# Patient Record
Sex: MALE | Race: BLACK OR AFRICAN AMERICAN | NOT HISPANIC OR LATINO | Employment: OTHER | URBAN - METROPOLITAN AREA
[De-identification: names, ages, dates, MRNs, and addresses within clinical notes are randomized per-mention and may not be internally consistent; named-entity substitution may affect disease eponyms.]

---

## 2017-01-20 ENCOUNTER — HOSPITAL ENCOUNTER (INPATIENT)
Facility: HOSPITAL | Age: 72
LOS: 1 days | Discharge: HOME | DRG: 811 | End: 2017-01-21
Attending: EMERGENCY MEDICINE | Admitting: HOSPITALIST
Payer: MEDICARE

## 2017-01-20 DIAGNOSIS — D64.9 SYMPTOMATIC ANEMIA: Primary | ICD-10-CM

## 2017-01-20 DIAGNOSIS — Z72.0 TOBACCO ABUSE: ICD-10-CM

## 2017-01-20 DIAGNOSIS — S13.9XXD CERVICAL SPRAIN, SUBSEQUENT ENCOUNTER: ICD-10-CM

## 2017-01-20 DIAGNOSIS — M96.1 POSTLAMINECTOMY SYNDROME: ICD-10-CM

## 2017-01-20 DIAGNOSIS — D50.0 IRON DEFICIENCY ANEMIA DUE TO CHRONIC BLOOD LOSS: ICD-10-CM

## 2017-01-20 DIAGNOSIS — R55 SYNCOPE: ICD-10-CM

## 2017-01-20 DIAGNOSIS — I10 HYPERTENSION, BENIGN: ICD-10-CM

## 2017-01-20 LAB
ABO + RH BLD: NORMAL
ALBUMIN SERPL BCP-MCNC: 3.9 G/DL
ALP SERPL-CCNC: 76 U/L
ALT SERPL W/O P-5'-P-CCNC: 12 U/L
ANION GAP SERPL CALC-SCNC: 7 MMOL/L
ANISOCYTOSIS BLD QL SMEAR: SLIGHT
AST SERPL-CCNC: 13 U/L
BASOPHILS NFR BLD: 2 %
BILIRUB SERPL-MCNC: 0.3 MG/DL
BILIRUB UR QL STRIP: NEGATIVE
BLD GP AB SCN CELLS X3 SERPL QL: NORMAL
BLOOD GROUP ANTIBODIES SERPL: NORMAL
BNP SERPL-MCNC: <10 PG/ML
BUN SERPL-MCNC: 14 MG/DL
CALCIUM SERPL-MCNC: 8.5 MG/DL
CHLORIDE SERPL-SCNC: 110 MMOL/L
CLARITY UR: CLEAR
CO2 SERPL-SCNC: 24 MMOL/L
COLOR UR: YELLOW
CREAT SERPL-MCNC: 0.9 MG/DL
DACRYOCYTES BLD QL SMEAR: ABNORMAL
DIFFERENTIAL METHOD: ABNORMAL
EOSINOPHIL NFR BLD: 3 %
ERYTHROCYTE [DISTWIDTH] IN BLOOD BY AUTOMATED COUNT: 25.8 %
EST. GFR  (AFRICAN AMERICAN): >60 ML/MIN/1.73 M^2
EST. GFR  (NON AFRICAN AMERICAN): >60 ML/MIN/1.73 M^2
GLUCOSE SERPL-MCNC: 88 MG/DL
GLUCOSE UR QL STRIP: NEGATIVE
HCT VFR BLD AUTO: 21.4 %
HGB BLD-MCNC: 6 G/DL
HGB UR QL STRIP: NEGATIVE
HYPOCHROMIA BLD QL SMEAR: ABNORMAL
KETONES UR QL STRIP: NEGATIVE
LEUKOCYTE ESTERASE UR QL STRIP: NEGATIVE
LYMPHOCYTES NFR BLD: 6 %
MAGNESIUM SERPL-MCNC: 2.2 MG/DL
MCH RBC QN AUTO: 16 PG
MCHC RBC AUTO-ENTMCNC: 28 %
MCV RBC AUTO: 57 FL
MONOCYTES NFR BLD: 1 %
NEUTROPHILS NFR BLD: 88 %
NITRITE UR QL STRIP: NEGATIVE
NRBC BLD-RTO: 1 /100 WBC
OVALOCYTES BLD QL SMEAR: ABNORMAL
PH UR STRIP: 5 [PH] (ref 5–8)
PLATELET # BLD AUTO: 549 K/UL
PMV BLD AUTO: ABNORMAL FL
POIKILOCYTOSIS BLD QL SMEAR: ABNORMAL
POLYCHROMASIA BLD QL SMEAR: ABNORMAL
POTASSIUM SERPL-SCNC: 3.8 MMOL/L
PROT SERPL-MCNC: 6.7 G/DL
PROT UR QL STRIP: NEGATIVE
RBC # BLD AUTO: 3.74 M/UL
SCHISTOCYTES BLD QL SMEAR: PRESENT
SODIUM SERPL-SCNC: 141 MMOL/L
SP GR UR STRIP: 1.02 (ref 1–1.03)
TARGETS BLD QL SMEAR: ABNORMAL
TROPONIN I SERPL DL<=0.01 NG/ML-MCNC: <0.006 NG/ML
URN SPEC COLLECT METH UR: ABNORMAL
UROBILINOGEN UR STRIP-ACNC: ABNORMAL EU/DL
WBC # BLD AUTO: 10.73 K/UL

## 2017-01-20 PROCEDURE — 85027 COMPLETE CBC AUTOMATED: CPT

## 2017-01-20 PROCEDURE — 81003 URINALYSIS AUTO W/O SCOPE: CPT

## 2017-01-20 PROCEDURE — 83880 ASSAY OF NATRIURETIC PEPTIDE: CPT

## 2017-01-20 PROCEDURE — 11000001 HC ACUTE MED/SURG PRIVATE ROOM

## 2017-01-20 PROCEDURE — 86905 BLOOD TYPING RBC ANTIGENS: CPT

## 2017-01-20 PROCEDURE — 83735 ASSAY OF MAGNESIUM: CPT

## 2017-01-20 PROCEDURE — 85007 BL SMEAR W/DIFF WBC COUNT: CPT

## 2017-01-20 PROCEDURE — 84484 ASSAY OF TROPONIN QUANT: CPT

## 2017-01-20 PROCEDURE — 99285 EMERGENCY DEPT VISIT HI MDM: CPT

## 2017-01-20 PROCEDURE — 63600175 PHARM REV CODE 636 W HCPCS: Performed by: EMERGENCY MEDICINE

## 2017-01-20 PROCEDURE — C9113 INJ PANTOPRAZOLE SODIUM, VIA: HCPCS | Performed by: EMERGENCY MEDICINE

## 2017-01-20 PROCEDURE — 86900 BLOOD TYPING SEROLOGIC ABO: CPT

## 2017-01-20 PROCEDURE — 86901 BLOOD TYPING SEROLOGIC RH(D): CPT

## 2017-01-20 PROCEDURE — 86922 COMPATIBILITY TEST ANTIGLOB: CPT

## 2017-01-20 PROCEDURE — 25000003 PHARM REV CODE 250: Performed by: EMERGENCY MEDICINE

## 2017-01-20 PROCEDURE — 80053 COMPREHEN METABOLIC PANEL: CPT

## 2017-01-20 PROCEDURE — 86870 RBC ANTIBODY IDENTIFICATION: CPT | Mod: 91

## 2017-01-20 RX ORDER — IBUPROFEN 200 MG
1 TABLET ORAL DAILY
Status: DISCONTINUED | OUTPATIENT
Start: 2017-01-20 | End: 2017-01-21 | Stop reason: HOSPADM

## 2017-01-20 RX ORDER — BENAZEPRIL HYDROCHLORIDE 10 MG/1
20 TABLET ORAL DAILY
Status: DISCONTINUED | OUTPATIENT
Start: 2017-01-21 | End: 2017-01-21 | Stop reason: HOSPADM

## 2017-01-20 RX ORDER — HYDROCODONE BITARTRATE AND ACETAMINOPHEN 500; 5 MG/1; MG/1
TABLET ORAL
Status: DISCONTINUED | OUTPATIENT
Start: 2017-01-20 | End: 2017-01-21 | Stop reason: HOSPADM

## 2017-01-20 RX ORDER — ONDANSETRON 2 MG/ML
4 INJECTION INTRAMUSCULAR; INTRAVENOUS EVERY 12 HOURS PRN
Status: DISCONTINUED | OUTPATIENT
Start: 2017-01-20 | End: 2017-01-21 | Stop reason: HOSPADM

## 2017-01-20 RX ORDER — AMLODIPINE BESYLATE 5 MG/1
5 TABLET ORAL DAILY
Status: DISCONTINUED | OUTPATIENT
Start: 2017-01-21 | End: 2017-01-21 | Stop reason: HOSPADM

## 2017-01-20 RX ORDER — PANTOPRAZOLE SODIUM 40 MG/1
40 TABLET, DELAYED RELEASE ORAL DAILY
Status: CANCELLED | OUTPATIENT
Start: 2017-01-21

## 2017-01-20 RX ORDER — ACETAMINOPHEN 325 MG/1
650 TABLET ORAL EVERY 6 HOURS PRN
Status: DISCONTINUED | OUTPATIENT
Start: 2017-01-20 | End: 2017-01-21 | Stop reason: HOSPADM

## 2017-01-20 RX ORDER — PANTOPRAZOLE SODIUM 40 MG/10ML
40 INJECTION, POWDER, LYOPHILIZED, FOR SOLUTION INTRAVENOUS DAILY
Status: DISCONTINUED | OUTPATIENT
Start: 2017-01-20 | End: 2017-01-21 | Stop reason: HOSPADM

## 2017-01-20 RX ADMIN — NICOTINE 1 PATCH: 21 PATCH, EXTENDED RELEASE TRANSDERMAL at 06:01

## 2017-01-20 RX ADMIN — PANTOPRAZOLE SODIUM 40 MG: 40 INJECTION, POWDER, FOR SOLUTION INTRAVENOUS at 09:01

## 2017-01-20 NOTE — IP AVS SNAPSHOT
[place] [address] [address] [place] [address]  Phone: [phone]           Patient Discharge Instructions     Our goal is to set you up for success. This packet includes information on your condition, medications, and your home care. It will help you to care for yourself so you don't get sicker and need to go back to the hospital.     Please ask your nurse if you have any questions.        There are many details to remember when preparing to leave the hospital. Here is what you will need to do:    1. Take your medicine. If you are prescribed medications, review your Medication List in the following pages. You may have new medications to  at the pharmacy and others that you'll need to stop taking. Review the instructions for how and when to take your medications. Talk with your doctor or nurses if you are unsure of what to do.     2. Go to your follow-up appointments. Specific follow-up information is listed in the following pages. Your may be contacted by a transition nurse or clinical provider about future appointments. Be sure we have all of the phone numbers to reach you, if needed. Please contact your provider's office if you are unable to make an appointment.     3. Watch for warning signs. Your doctor or nurse will give you detailed warning signs to watch for and when to call for assistance. These instructions may also include educational information about your condition. If you experience any of warning signs to your health, call your doctor.               ** Verify the list of medication(s) below is accurate and up to date. Carry this with you in case of emergency. If your medications have changed, please notify your healthcare provider.             Medication List      START taking these medications        Additional Info                      nicotine 21 mg/24 hr   Commonly known as:  NICODERM CQ   Refills:  0   Dose:  1 patch    Last time this was given:  1 patch on  1/21/2017  8:54 AM   Instructions:  Place 1 patch onto the skin once daily.     Begin Date    AM    Noon    PM    Bedtime         CONTINUE taking these medications        Additional Info                      amlodipine-benazepril 5-20 mg 5-20 mg per capsule   Commonly known as:  LOTREL   Refills:  0      Begin Date    AM    Noon    PM    Bedtime       baclofen 10 MG tablet   Commonly known as:  LIORESAL   Refills:  0      Begin Date    AM    Noon    PM    Bedtime       CIALIS 20 MG Tab   Refills:  0   Generic drug:  tadalafil      Begin Date    AM    Noon    PM    Bedtime       gabapentin 300 MG capsule   Commonly known as:  NEURONTIN   Refills:  0   Dose:  300 mg    Last time this was given:  300 mg on 1/21/2017  8:51 AM   Instructions:  Take 300 mg by mouth 2 (two) times daily.     Begin Date    AM    Noon    PM    Bedtime       hydrocodone-acetaminophen 5-325mg 5-325 mg per tablet   Commonly known as:  NORCO   Refills:  1      Begin Date    AM    Noon    PM    Bedtime         STOP taking these medications     aspirin 81 MG EC tablet   Commonly known as:  ECOTRIN       diazePAM 5 MG tablet   Commonly known as:  VALIUM       oxycodone-acetaminophen 7.5-500 mg per tablet   Commonly known as:  PERCOCET       tramadol 50 mg tablet   Commonly known as:  ULTRAM       UNKNOWN TO PATIENT       VIAGRA 100 MG tablet   Generic drug:  sildenafil            Where to Get Your Medications      You can get these medications from any pharmacy     You don't need a prescription for these medications     nicotine 21 mg/24 hr                  Please bring to all follow up appointments:    1. A copy of your discharge instructions.  2. All medicines you are currently taking in their original bottles.  3. Identification and insurance card.    Please arrive 15 minutes ahead of scheduled appointment time.    Please call 24 hours in advance if you must reschedule your appointment and/or time.        Follow-up Information     Follow up with  Robert Sosa MD In 1 week.    Specialty:  Internal Medicine    Contact information:    [facility] [address]  [phone]          Discharge Instructions     Future Orders    Activity as tolerated     Call MD for:  persistent dizziness, light-headedness, or visual disturbances     Call MD for:  persistent nausea and vomiting or diarrhea     Call MD for:  temperature >100.4     Diet Cardiac         Primary Diagnosis     Your primary diagnosis was:  Not on File      Admission Information     Date & Time Provider Department CSN    1/20/2017  1:54 PM Nahomi Edgar MD [facility] [id number]      Care Providers     Provider Role Specialty Primary office phone    Nahomi Edgar MD Attending Provider Hospitalist [phone]    Virgil Johnson MD Consulting Physician  Gastroenterology [phone]    Doreen Figueroa MD Consulting Physician  Hematology and Oncology [phone]      Your Vitals Were     BP Pulse Temp Resp Height Weight    135/72 90 98.5 °F (36.9 °C) (Oral) 18 6' (1.829 m) 81.7 kg (180 lb 0.1 oz)    SpO2 BMI             99% 24.41 kg/m2         Recent Lab Values     No lab values to display.      Pending Labs     Order Current Status    Prepare RBC 2 Units Preliminary result      Allergies as of 1/21/2017     No Known Allergies      [facility]     [facility] Nurse Care Line - 24/7 Assistance  Unless otherwise directed by your provider, please contact [facility], our nurse care line that is available for 24/7 assistance.     Registered nurses in the [facility] provide clinical advisement, health education, appointment booking, and other advisory services.  Call for this free service at [phone].        Advance Directives     An advance directive is a document which, in the event you are no longer able to make decisions for yourself, tells your healthcare team what kind of treatment you do or do not want to receive, or who you  would like to make those decisions for you.  If you do not currently have an advance directive, [facility] encourages you to create one.  For more information call:  [phone]), [phone] ([phone]),  or log on to [email or web address].        Smoking Cessation     If you would like to quit smoking:   You may be eligible for free services if you are a [place] resident and started smoking cigarettes before September 1, 1988.  Call the Smoking Cessation [employer] (SCT) toll free at (572) 323-0325 or (984) 465-9724.   Call 4-978-QUIT-NOW if you do not meet the above criteria.            Language Assistance Services     ATTENTION: Language assistance services are available, free of charge. Please call [phone].      ATENCIÓN: Si habla kamar, tiene a marlow disposición servicios gratuitos de asistencia lingüística. Llame al [phone].     [facility] Ý: N?u b?n nói Ti?ng Vi?t, có các d?ch v? h? tr? ngôn ng? mi?n phí dành cho b?n. G?i s? [phone].        Blood Transfusion Reaction Signs and Symptoms     The blood you have received has been matched for you as carefully as possible. Most patients who receive a blood transfusion do not experience problems. However, there can be a delayed reaction that happens a few weeks after your blood transfusion. Contact your physician immediately if you experience any NEW SYMPTOMS listed below:     Fever greater than 100.4 degrees    Chills   Yellow color to your skin or eyes(Jaundice)   Back pain, chest pain, or pain at the infusion site   Weakness (more than usual)   Discomfort or uneasiness more than usual (Malaise)   Nausea or vomiting   Shortness of breath, wheezing, or coughing   Higher or lower blood pressure than normal   Skin rash, itching, skin redness, or localized swelling (example: hands or feet)   Urinating less than normal   Urine appears reddish or orange and is darker than normal      Remember that some these signs may already  exist for you--such as having chronic back pain or high blood pressure. You only need to look for and report to your doctor any new occurrences since your blood transfusion that are of concern.         [facility] complies with applicable Federal civil rights laws and does not discriminate on the basis of race, color, national origin, age, disability, or sex.

## 2017-01-20 NOTE — ED NOTES
Hourly rounding performed.  Pt resting in bed, side rails up, call light within reach, wife at bedside.  Cardiac, BP, and pulse ox monitoring in place, VS stable.  No complaints at this time, will continue to monitor.

## 2017-01-20 NOTE — ED TRIAGE NOTES
Pt arrived to ED from home via EMS.  Pt had been standing on a ladder cleaning vents in his bathroom with bleach and began to feel lightheaded.  Pt stated once he got down from the ladder he began to feel a bit nauseous and felt lightheaded.  EMS reported that he was slightly hypotensive and slightly orthostatic (drop of 10 mmHg systolic) when EMS arrived.  EMS gave 500 mL fluids en route to ED and BP stabilized.  Pt states he does have low iron levels and takes iron supplements.

## 2017-01-20 NOTE — ED PROVIDER NOTES
"Encounter Date: 1/20/2017    SCRIBE #1 NOTE: I, Natasha Trinidad, am scribing for, and in the presence of,  Itzel Coronado MD. I have scribed the following portions of the note - Other sections scribed: HPI/ROS.       History     Chief Complaint   Patient presents with    Loss of Consciousness     Pt states he feels dizzy standing up.  Doesn't remember falling      Review of patient's allergies indicates:  No Known Allergies  HPI Comments: CC: Lightheadedness    HPI: 71 y.o. M with HTN and iron deficiency enema presents to the ED via EMS c/o lightheadedness beginning PTA. Associated symptoms are nausea (resolved), emesis x1, and dizziness. Pt states he was cleaning out his air vents with bleach prior to onset of symptoms. Bedroom window was open, but the area was mainly enclosed. Pt was walking to his bed when he began to feel dizzy. Pt states, "I almost blacked out but managed to catch myself to the bed." Pt slightly hit his head on the bed post but is not c/o any pain. Per EMS, pt's orthostatic readings varied. Pt was given fluids en route and reports of feeling better. Pt has required blood transfusions in the past. Pt recently had his blood tested last Wednesday and states that his hemoglobin level was low. Pt denies headache, LOC, CP, and SOB.     Addendum: pt states he underwent capsule endoscopy by GI and was told he has an upper GI bleed for which they are medically managing at this time.    The history is provided by the patient.     Past Medical History   Diagnosis Date    Back pain     Hypertension     Hypertension, benign 6/11/2013     Diagnosed 1983.     No past medical history pertinent negatives.  Past Surgical History   Procedure Laterality Date    Back surgery       History reviewed. No pertinent family history.  Social History   Substance Use Topics    Smoking status: Current Every Day Smoker     Packs/day: 1.00     Years: 50.00    Smokeless tobacco: None    Alcohol use Yes      Comment: Socially. "     Review of Systems   Constitutional: Negative for chills and fever.   HENT: Negative for congestion and sore throat.    Eyes: Negative for visual disturbance.   Respiratory: Negative for cough and shortness of breath.    Cardiovascular: Negative for chest pain.   Gastrointestinal: Negative for abdominal pain, diarrhea, nausea and vomiting.   Genitourinary: Negative for dysuria.   Musculoskeletal: Negative for neck pain.   Skin: Negative for rash and wound.   Neurological: Positive for dizziness and light-headedness. Negative for headaches.        (-) LOC       Physical Exam   Initial Vitals   BP Pulse Resp Temp SpO2   01/20/17 1355 01/20/17 1355 01/20/17 1355 01/20/17 1355 01/20/17 1355   111/65 80 18 98.7 °F (37.1 °C) 94 %     Physical Exam    Nursing note and vitals reviewed.  Constitutional: Vital signs are normal. He appears well-developed and well-nourished. He is active.  Non-toxic appearance. No distress.   HENT:   Head: Normocephalic and atraumatic.   Eyes: EOM are normal.   Pallor to conjunctivae.   Neck: Trachea normal. Neck supple.   Cardiovascular: Normal rate, regular rhythm and normal heart sounds.   Pulmonary/Chest: Breath sounds normal. No respiratory distress.   Abdominal: Soft. Normal appearance and bowel sounds are normal. He exhibits no distension. There is no tenderness.   Musculoskeletal: Normal range of motion. He exhibits no edema.   Neurological: He is alert.   Skin: Skin is warm, dry and intact.   Psychiatric: He has a normal mood and affect.         ED Course   Critical Care  Date/Time: 1/20/2017 4:06 PM  Performed by: BRITNI DE LA TORRE  Authorized by: BRITNI DE LA TORRE   Direct patient critical care time: 25 minutes  Additional history critical care time: 10 minutes  Ordering / reviewing critical care time: 8 minutes  Documentation critical care time: 5 minutes  Consulting other physicians critical care time: 5 minutes  Total critical care time (exclusive of procedural time) : 53  minutes  Critical care time was exclusive of separately billable procedures and treating other patients and teaching time.  Critical care was necessary to treat or prevent imminent or life-threatening deterioration of the following conditions: circulatory failure.  Critical care was time spent personally by me on the following activities: blood draw for specimens, development of treatment plan with patient or surrogate, discussions with consultants, evaluation of patient's response to treatment, examination of patient, obtaining history from patient or surrogate, ordering and performing treatments and interventions, ordering and review of laboratory studies, ordering and review of radiographic studies, pulse oximetry, re-evaluation of patient's condition and review of old charts.        Labs Reviewed   CBC W/ AUTO DIFFERENTIAL - Abnormal; Notable for the following:        Result Value    RBC 3.74 (*)     Hemoglobin 6.0 (*)     Hematocrit 21.4 (*)     MCV 57 (*)     MCH 16.0 (*)     MCHC 28.0 (*)     RDW 25.8 (*)     Platelets 549 (*)     nRBC 1 (*)     Gran% 88.0 (*)     Lymph% 6.0 (*)     Mono% 1.0 (*)     Basophil% 2.0 (*)     All other components within normal limits   COMPREHENSIVE METABOLIC PANEL - Abnormal; Notable for the following:     Calcium 8.5 (*)     Anion Gap 7 (*)     All other components within normal limits   URINALYSIS - Abnormal; Notable for the following:     Urobilinogen, UA 2.0-3.0 (*)     All other components within normal limits   MAGNESIUM   B-TYPE NATRIURETIC PEPTIDE   TROPONIN I   TYPE & SCREEN   ANTIBODY IDENTIFICATION     EKG Readings: (Independently Interpreted)   Initial Reading: No STEMI. Rhythm: Normal Sinus Rhythm. Heart Rate: 85. Ectopy: No Ectopy. Conduction: RBBB.       X-Rays:   Independently Interpreted Readings:   Chest X-Ray: Normal heart size.   Head CT: No hemorrhage.     Medical Decision Making:   History:   Old Medical Records: I decided to obtain old medical  records.  Initial Assessment:   This is an emergent evaluation of a 71-year-old man who presented to the emergency department today secondary to one episode of emesis, dizziness, lightheadedness, and syncope.  Differential Diagnosis:   Differential diagnoses include vasovagal syncope, anemia, electrolyte abnormality, infectious process, stroke, ACS.  Independently Interpreted Test(s):   I have ordered and independently interpreted X-rays - see prior notes.  I have ordered and independently interpreted EKG Reading(s) - see prior notes  Clinical Tests:   Lab Tests: Ordered and Reviewed  The following lab test(s) were unremarkable: CBC, CMP, Urinalysis, Troponin and BNP  Radiological Study: Ordered and Reviewed  Medical Tests: Ordered and Reviewed  ED Management:  On physical examination, patient was in no acute distress.  Lung sounds were clear to auscultation bilaterally and heart sounds were within normal limits.  Abdomen was soft, nontender, nondistended with good bowel sounds throughout.  He had pale conjunctiva. EKG was obtained and was unremarkable.  Chest x-ray showed no acute findings.  CT of the head showed no acute intracranial abnormalities however chronic microvascular ischemic changes.  Labs were concerning for a hemoglobin of 6 and hematocrit of 21.4.  The remainder of his labs were unremarkable.  I will admit him to medicine given his symptomatic anemia to receive a blood transfusion. I discussed this with his hematologist as well, who spoke with the patient and also advised blood transfusion.     Itzel Coronado MD  4:06 PM  1/20/2017    Pt was discussed with Dr. Edgar who agreed to admit the patient.     Itzel Coronado MD  5:25 PM  1/20/2017    Other:   I have discussed this case with another health care provider.       <> Summary of the Discussion: Dr. Edgar agrees to admit the patient.             Scribe Attestation:   Scribe #1: I performed the above scribed service and the documentation accurately  describes the services I performed. I attest to the accuracy of the note.    Attending Attestation:           Physician Attestation for Scribe:  Physician Attestation Statement for Scribe #1: I, Itzel Coronado MD, reviewed documentation, as scribed by Natasha Abdi in my presence, and it is both accurate and complete.                 ED Course     Clinical Impression:   The primary encounter diagnosis was Symptomatic anemia. A diagnosis of Syncope was also pertinent to this visit.          Itzel Coronado MD  01/20/17 2609

## 2017-01-21 VITALS
BODY MASS INDEX: 24.38 KG/M2 | HEIGHT: 72 IN | WEIGHT: 180 LBS | DIASTOLIC BLOOD PRESSURE: 72 MMHG | OXYGEN SATURATION: 99 % | SYSTOLIC BLOOD PRESSURE: 135 MMHG | HEART RATE: 90 BPM | TEMPERATURE: 99 F | RESPIRATION RATE: 18 BRPM

## 2017-01-21 PROBLEM — D64.9 SYMPTOMATIC ANEMIA: Status: ACTIVE | Noted: 2017-01-21

## 2017-01-21 PROBLEM — Z72.0 TOBACCO ABUSE: Status: ACTIVE | Noted: 2017-01-21

## 2017-01-21 PROBLEM — D50.0 IRON DEFICIENCY ANEMIA DUE TO CHRONIC BLOOD LOSS: Status: ACTIVE | Noted: 2017-01-21

## 2017-01-21 LAB
ALBUMIN SERPL BCP-MCNC: 3.7 G/DL
ALP SERPL-CCNC: 71 U/L
ALT SERPL W/O P-5'-P-CCNC: 12 U/L
ANION GAP SERPL CALC-SCNC: 8 MMOL/L
ANISOCYTOSIS BLD QL SMEAR: ABNORMAL
ANISOCYTOSIS BLD QL SMEAR: ABNORMAL
AST SERPL-CCNC: 14 U/L
BASOPHILS # BLD AUTO: 0.1 K/UL
BASOPHILS # BLD AUTO: 0.12 K/UL
BASOPHILS NFR BLD: 0.9 %
BASOPHILS NFR BLD: 1.6 %
BILIRUB SERPL-MCNC: 0.6 MG/DL
BLD PROD TYP BPU: NORMAL
BLD PROD TYP BPU: NORMAL
BLOOD UNIT EXPIRATION DATE: NORMAL
BLOOD UNIT EXPIRATION DATE: NORMAL
BLOOD UNIT TYPE CODE: 5100
BLOOD UNIT TYPE CODE: 5100
BLOOD UNIT TYPE: NORMAL
BLOOD UNIT TYPE: NORMAL
BUN SERPL-MCNC: 10 MG/DL
CALCIUM SERPL-MCNC: 8.5 MG/DL
CHLORIDE SERPL-SCNC: 110 MMOL/L
CO2 SERPL-SCNC: 22 MMOL/L
CODING SYSTEM: NORMAL
CODING SYSTEM: NORMAL
CREAT SERPL-MCNC: 0.8 MG/DL
DACRYOCYTES BLD QL SMEAR: ABNORMAL
DACRYOCYTES BLD QL SMEAR: ABNORMAL
DIFFERENTIAL METHOD: ABNORMAL
DIFFERENTIAL METHOD: ABNORMAL
DISPENSE STATUS: NORMAL
DISPENSE STATUS: NORMAL
EOSINOPHIL # BLD AUTO: 0.3 K/UL
EOSINOPHIL # BLD AUTO: 0.3 K/UL
EOSINOPHIL NFR BLD: 2.4 %
EOSINOPHIL NFR BLD: 4.4 %
ERYTHROCYTE [DISTWIDTH] IN BLOOD BY AUTOMATED COUNT: 26.3 %
ERYTHROCYTE [DISTWIDTH] IN BLOOD BY AUTOMATED COUNT: 27.9 %
EST. GFR  (AFRICAN AMERICAN): >60 ML/MIN/1.73 M^2
EST. GFR  (NON AFRICAN AMERICAN): >60 ML/MIN/1.73 M^2
GLUCOSE SERPL-MCNC: 89 MG/DL
HCT VFR BLD AUTO: 25.5 %
HCT VFR BLD AUTO: 28.6 %
HGB BLD-MCNC: 7.2 G/DL
HGB BLD-MCNC: 8.3 G/DL
HYPOCHROMIA BLD QL SMEAR: ABNORMAL
HYPOCHROMIA BLD QL SMEAR: ABNORMAL
LYMPHOCYTES # BLD AUTO: 1.2 K/UL
LYMPHOCYTES # BLD AUTO: 1.3 K/UL
LYMPHOCYTES NFR BLD: 10.5 %
LYMPHOCYTES NFR BLD: 17.3 %
MCH RBC QN AUTO: 17.2 PG
MCH RBC QN AUTO: 17.9 PG
MCHC RBC AUTO-ENTMCNC: 28.2 %
MCHC RBC AUTO-ENTMCNC: 29 %
MCV RBC AUTO: 61 FL
MCV RBC AUTO: 62 FL
MONOCYTES # BLD AUTO: 0.6 K/UL
MONOCYTES # BLD AUTO: 0.8 K/UL
MONOCYTES NFR BLD: 7 %
MONOCYTES NFR BLD: 8.3 %
NEUTROPHILS # BLD AUTO: 5.2 K/UL
NEUTROPHILS # BLD AUTO: 8.9 K/UL
NEUTROPHILS NFR BLD: 68.4 %
NEUTROPHILS NFR BLD: 79.6 %
OVALOCYTES BLD QL SMEAR: ABNORMAL
OVALOCYTES BLD QL SMEAR: ABNORMAL
PLATELET # BLD AUTO: 362 K/UL
PLATELET # BLD AUTO: 434 K/UL
PLATELET BLD QL SMEAR: ABNORMAL
PLATELET BLD QL SMEAR: ABNORMAL
PMV BLD AUTO: ABNORMAL FL
PMV BLD AUTO: ABNORMAL FL
POIKILOCYTOSIS BLD QL SMEAR: ABNORMAL
POIKILOCYTOSIS BLD QL SMEAR: ABNORMAL
POLYCHROMASIA BLD QL SMEAR: ABNORMAL
POLYCHROMASIA BLD QL SMEAR: ABNORMAL
POTASSIUM SERPL-SCNC: 4.5 MMOL/L
PROT SERPL-MCNC: 6.8 G/DL
RBC # BLD AUTO: 4.18 M/UL
RBC # BLD AUTO: 4.64 M/UL
SCHISTOCYTES BLD QL SMEAR: PRESENT
SCHISTOCYTES BLD QL SMEAR: PRESENT
SODIUM SERPL-SCNC: 140 MMOL/L
SPHEROCYTES BLD QL SMEAR: ABNORMAL
TARGETS BLD QL SMEAR: ABNORMAL
TRANS ERYTHROCYTES VOL PATIENT: NORMAL ML
TRANS ERYTHROCYTES VOL PATIENT: NORMAL ML
WBC # BLD AUTO: 11.25 K/UL
WBC # BLD AUTO: 7.57 K/UL

## 2017-01-21 PROCEDURE — 36430 TRANSFUSION BLD/BLD COMPNT: CPT

## 2017-01-21 PROCEDURE — 80053 COMPREHEN METABOLIC PANEL: CPT

## 2017-01-21 PROCEDURE — 25000003 PHARM REV CODE 250: Performed by: EMERGENCY MEDICINE

## 2017-01-21 PROCEDURE — 36415 COLL VENOUS BLD VENIPUNCTURE: CPT

## 2017-01-21 PROCEDURE — C9113 INJ PANTOPRAZOLE SODIUM, VIA: HCPCS | Performed by: EMERGENCY MEDICINE

## 2017-01-21 PROCEDURE — P9021 RED BLOOD CELLS UNIT: HCPCS

## 2017-01-21 PROCEDURE — 99222 1ST HOSP IP/OBS MODERATE 55: CPT | Mod: GC,,, | Performed by: INTERNAL MEDICINE

## 2017-01-21 PROCEDURE — 85025 COMPLETE CBC W/AUTO DIFF WBC: CPT | Mod: 91

## 2017-01-21 PROCEDURE — 25000003 PHARM REV CODE 250: Performed by: HOSPITALIST

## 2017-01-21 PROCEDURE — 63600175 PHARM REV CODE 636 W HCPCS: Performed by: EMERGENCY MEDICINE

## 2017-01-21 PROCEDURE — 86902 BLOOD TYPE ANTIGEN DONOR EA: CPT | Mod: 91

## 2017-01-21 RX ORDER — GABAPENTIN 300 MG/1
300 CAPSULE ORAL 2 TIMES DAILY
Status: DISCONTINUED | OUTPATIENT
Start: 2017-01-21 | End: 2017-01-21 | Stop reason: HOSPADM

## 2017-01-21 RX ORDER — IBUPROFEN 200 MG
1 TABLET ORAL DAILY
Refills: 0 | COMMUNITY
Start: 2017-01-21

## 2017-01-21 RX ORDER — HYDROCODONE BITARTRATE AND ACETAMINOPHEN 5; 325 MG/1; MG/1
1 TABLET ORAL EVERY 6 HOURS PRN
Status: DISCONTINUED | OUTPATIENT
Start: 2017-01-21 | End: 2017-01-21 | Stop reason: HOSPADM

## 2017-01-21 RX ADMIN — GABAPENTIN 300 MG: 300 CAPSULE ORAL at 08:01

## 2017-01-21 RX ADMIN — AMLODIPINE BESYLATE 5 MG: 5 TABLET ORAL at 08:01

## 2017-01-21 RX ADMIN — BENAZEPRIL HYDROCHLORIDE 20 MG: 10 TABLET, FILM COATED ORAL at 08:01

## 2017-01-21 RX ADMIN — PANTOPRAZOLE SODIUM 40 MG: 40 INJECTION, POWDER, FOR SOLUTION INTRAVENOUS at 08:01

## 2017-01-21 RX ADMIN — NICOTINE 1 PATCH: 21 PATCH, EXTENDED RELEASE TRANSDERMAL at 08:01

## 2017-01-21 NOTE — CONSULTS
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 He was getting IV Iron with feraheme every 3-4 months. Last IV iron Rx was about 4 months ago.     He got 2 units of blood and his Hb increased to 8.3 g/dL. He feels better and is ready to go home.    He is a very good historian and plans to see  for follow-up later this week. He is retired .    MEDS and ALLERGIES Reviewed    Past Medical History   Diagnosis Date    Back pain     Hypertension     Hypertension, benign 6/11/2013     Diagnosed 1983.     Past Surgical History   Procedure Laterality Date    Back surgery       History reviewed. No pertinent family history.  Social History   Substance Use Topics    Smoking status: Current Every Day Smoker     Packs/day: 1.00     Years: 50.00    Smokeless tobacco: None    Alcohol use Yes      Comment: Socially.       Review of Systems:  Constitutional: no fever or chills  Eyes: negative for icterus and redness  Respiratory: no cough. shorness of breath resolved.  Cardiovascular: chest pain or palpitations resolved.  Gastrointestinal: negative for abdominal pain, melena and nausea  Hematologic/Lymphatic: no easy bruising or lymphadenopathy  Neurological: no seizures or tremors    OBJECTIVE:     Vital Signs (Most Recent)  Temp: 98.5 °F (36.9 °C) (01/21/17 1138)  Pulse: 90 (01/21/17 1138)  Resp: 18 (01/21/17 0923)  BP: 135/72 (01/21/17 1138)  SpO2: 99 % (01/21/17 0923)    Pain Assessment: No pain reported at this time    Vital Signs Range (Last 24H):  Temp:  [96.3 °F (35.7 °C)-98.6 °F (37 °C)]   Pulse:  [76-90]   Resp:  [16-19]   BP: (118-150)/(58-80)   SpO2:  [97 %-100 %]     Physical  Exam:  General: well developed, well nourished  Eyes: negative findings: conjunctivae and sclerae normal   Lungs:  normal respiratory effort and no wheeze  Cardiovascular: Heart: regular rate and rhythm and S1, S2 normal.   Extremities: no cyanosis or edema, or clubbing.  Abdomen: no masses palpable and no organomegaly.  Lymph Nodes: No cervical or supraclavicular adenopathy  Neurologic: Normal strength and tone. No focal numbness or weakness  Mental status: Alert, oriented, thought content appropriate  Motor:grossly normal    Laboratory:  CBC with Differential:    Recent Labs  Lab 01/21/17  1348   WBC 7.57   LYMPH 17.3*  1.3   BASOPHIL 1.6   RBC 4.64   HCT 28.6*   HGB 8.3*   MCV 62*   MCH 17.9*   RDW 27.9*   *   MPV SEE COMMENT     CMP:    Recent Labs  Lab 01/21/17  0436   GLU 89   CALCIUM 8.5*   ALBUMIN 3.7   PROT 6.8      K 4.5   CO2 22*      BUN 10   CREATININE 0.8   ALKPHOS 71   ALT 12   AST 14   BILITOT 0.6       ASSESSMENT/PLAN:   1. 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 Hence the presence of schistocytes is not clinically significant.    He has had an appropriate increase in his Hb level. No further work-up needed form heme stand-point.    Ok to d/c home. He will follow-up with  later this week with repeat labs - to determine need for further IV iron therapy as out-pt.

## 2017-01-21 NOTE — ASSESSMENT & PLAN NOTE
+ antibodies to typed blood which delayed transfusion  On second unit of blood, for total of 2 units PRBCs  Repeat CBC after completion of second unit  H/o SB GIB - on protonix     GI and hematology consulted for any further recs, all previous studies and labs done elsewhere

## 2017-01-21 NOTE — PROGRESS NOTES
RN called lab for blood. Lab waiting for antigen report to come in. Lab will call RN once blood ready.

## 2017-01-21 NOTE — ED NOTES
Hourly rounding performed.  Pt resting in bed, side rails up, call light within reach, wife at bedside.  Cardiac, BP, and pulse ox monitoring in place, VS stable.  No complaints at this time, will continue to monitor.  Pt informed that he we are still waiting for room on the floor.  Waiting for nicotine patch from pharmacy.

## 2017-01-21 NOTE — ED NOTES
First contact with pt. Pt noted to be on cardiac monitor, automatic NIBP, pulse ox. Pt A&Ox4, SRx2.   states that blood is to be started on the floor.

## 2017-01-21 NOTE — SUBJECTIVE & OBJECTIVE
Past Medical History   Diagnosis Date    Back pain     Hypertension     Hypertension, benign 6/11/2013     Diagnosed 1983.       Past Surgical History   Procedure Laterality Date    Back surgery         Review of patient's allergies indicates:  No Known Allergies    No current facility-administered medications on file prior to encounter.      Current Outpatient Prescriptions on File Prior to Encounter   Medication Sig    amlodipine-benazepril 5-20 mg (LOTREL) 5-20 mg per capsule     baclofen (LIORESAL) 10 MG tablet     CIALIS 20 mg Tab     gabapentin (NEURONTIN) 300 MG capsule Take 300 mg by mouth 2 (two) times daily.    VIAGRA 100 mg tablet     aspirin (ECOTRIN) 81 MG EC tablet Take 1 tablet (81 mg total) by mouth once daily.    diazepam (VALIUM) 5 MG tablet Take 1 tablet (5 mg total) by mouth every 12 (twelve) hours as needed (tremor).    hydrocodone-acetaminophen 5-325mg (NORCO) 5-325 mg per tablet     oxycodone-acetaminophen (PERCOCET) 7.5-500 mg per tablet     tramadol (ULTRAM) 50 mg tablet     UNKNOWN TO PATIENT      Family History     None        Social History Main Topics    Smoking status: Current Every Day Smoker     Packs/day: 1.00     Years: 50.00    Smokeless tobacco: Not on file    Alcohol use Yes      Comment: Socially.    Drug use: No    Sexual activity: Not on file     Review of Systems   Constitutional: Positive for activity change, fatigue and unexpected weight change.   HENT: Negative.    Eyes: Negative.    Respiratory: Positive for shortness of breath.    Cardiovascular: Negative.    Gastrointestinal: Negative.    Endocrine: Negative.    Genitourinary: Negative.    Musculoskeletal: Positive for arthralgias and back pain.   Skin: Positive for pallor.   Neurological: Positive for dizziness, syncope and light-headedness.   Psychiatric/Behavioral: Negative.      Objective:     Vital Signs (Most Recent):  Temp: 97.5 °F (36.4 °C) (01/21/17 0640)  Pulse: 87 (01/21/17 0640)  Resp: 18  (01/21/17 0640)  BP: (!) 149/72 (01/21/17 0640)  SpO2: 98 % (01/21/17 0640) Vital Signs (24h Range):  Temp:  [96.3 °F (35.7 °C)-98.7 °F (37.1 °C)] 97.5 °F (36.4 °C)  Pulse:  [76-87] 87  Resp:  [16-18] 18  SpO2:  [94 %-100 %] 98 %  BP: ()/(54-93) 149/72     Weight: 81.2 kg (179 lb 0.2 oz)  Body mass index is 24.28 kg/(m^2).    Physical Exam   Constitutional: He is oriented to person, place, and time. He appears well-developed and well-nourished. No distress.   HENT:   Head: Normocephalic.   Mouth/Throat: Oropharynx is clear and moist.   Eyes: EOM are normal.   Pale conjunctivae   Neck: Normal range of motion. Neck supple. No JVD present. No thyromegaly present.   Cardiovascular: Normal rate, regular rhythm, normal heart sounds and intact distal pulses.    No murmur heard.  Pulmonary/Chest: Effort normal and breath sounds normal. No respiratory distress.   Abdominal: Soft. He exhibits no distension and no mass. There is no tenderness.   Musculoskeletal: Normal range of motion. He exhibits no edema.   Lymphadenopathy:     He has no cervical adenopathy.   Neurological: He is alert and oriented to person, place, and time.   Skin: Skin is warm and dry. He is not diaphoretic. There is pallor.   Psychiatric: He has a normal mood and affect. His behavior is normal.        Significant Labs:   CBC:   Recent Labs  Lab 01/20/17  1438 01/21/17  0436   WBC 10.73 11.25   HGB 6.0* 7.2*   HCT 21.4* 25.5*   * 362*     CMP:   Recent Labs  Lab 01/20/17  1438 01/21/17  0436    140   K 3.8 4.5    110   CO2 24 22*   GLU 88 89   BUN 14 10   CREATININE 0.9 0.8   CALCIUM 8.5* 8.5*   PROT 6.7 6.8   ALBUMIN 3.9 3.7   BILITOT 0.3 0.6   ALKPHOS 76 71   AST 13 14   ALT 12 12   ANIONGAP 7* 8   EGFRNONAA >60 >60     Coagulation: No results for input(s): INR, APTT in the last 48 hours.    Invalid input(s): PT    Significant Imaging: I have reviewed all pertinent imaging results/findings within the past 24 hours.

## 2017-01-21 NOTE — H&P
[facility] Hospital Medicine  History & Physical    Patient Name: Ramakrishna Latham Jr.  MRN: [id number]  Admission Date: 1/20/2017  Attending Physician: Nahomi Edgar MD   Primary Care Provider: Robert Sosa MD         Patient information was obtained from patient and ER records.     Subjective:     Principal Problem: symptomatic anemia   Chief Complaint:  Dizzy and lightheaded      HPI: 72 yo male with HTN, chronic back pain, chronic anemia with previous small bowel GIB presented from home with c/o feeling dizzy and lightheaded with syncope.  He has recently noticed feeling more fatigue and LE weakness. He sees a GI doctor and recently had colonoscopy that was normal.  He had EGD and pill endoscopy 3 years ago elsewhere and was told he had a small bowel bleed, no intervention done at that time. He was placed on iron supplements but not recently taking them.  He also sees a hematologist and intermittently gets IV iron infusions. He has never received a blood transfusion for his chronic anemia.  He denies NSAIDs but does take baby aspirin.  He endorses SOB with activity and no chest pain, no melena or hematochezia. + unintentional weight loss. On admit his h/h was 6.0/21.4 respectively with + schistocytes present.     Past Medical History   Diagnosis Date    Back pain     Hypertension     Hypertension, benign 6/11/2013     Diagnosed 1983.       Past Surgical History   Procedure Laterality Date    Back surgery         Review of patient's allergies indicates:  No Known Allergies    No current facility-administered medications on file prior to encounter.      Current Outpatient Prescriptions on File Prior to Encounter   Medication Sig    amlodipine-benazepril 5-20 mg (LOTREL) 5-20 mg per capsule     baclofen (LIORESAL) 10 MG tablet     CIALIS 20 mg Tab     gabapentin (NEURONTIN) 300 MG capsule Take 300 mg by mouth 2 (two) times daily.    VIAGRA 100 mg tablet     aspirin (ECOTRIN) 81 MG  EC tablet Take 1 tablet (81 mg total) by mouth once daily.    diazepam (VALIUM) 5 MG tablet Take 1 tablet (5 mg total) by mouth every 12 (twelve) hours as needed (tremor).    hydrocodone-acetaminophen 5-325mg (NORCO) 5-325 mg per tablet     oxycodone-acetaminophen (PERCOCET) 7.5-500 mg per tablet     tramadol (ULTRAM) 50 mg tablet     UNKNOWN TO PATIENT      Family History     None        Social History Main Topics    Smoking status: Current Every Day Smoker     Packs/day: 1.00     Years: 50.00    Smokeless tobacco: Not on file    Alcohol use Yes      Comment: Socially.    Drug use: No    Sexual activity: Not on file     Review of Systems   Constitutional: Positive for activity change, fatigue and unexpected weight change.   HENT: Negative.    Eyes: Negative.    Respiratory: Positive for shortness of breath.    Cardiovascular: Negative.    Gastrointestinal: Negative.    Endocrine: Negative.    Genitourinary: Negative.    Musculoskeletal: Positive for arthralgias and back pain.   Skin: Positive for pallor.   Neurological: Positive for dizziness, syncope and light-headedness.   Psychiatric/Behavioral: Negative.      Objective:     Vital Signs (Most Recent):  Temp: 97.5 °F (36.4 °C) (01/21/17 0640)  Pulse: 87 (01/21/17 0640)  Resp: 18 (01/21/17 0640)  BP: (!) 149/72 (01/21/17 0640)  SpO2: 98 % (01/21/17 0640) Vital Signs (24h Range):  Temp:  [96.3 °F (35.7 °C)-98.7 °F (37.1 °C)] 97.5 °F (36.4 °C)  Pulse:  [76-87] 87  Resp:  [16-18] 18  SpO2:  [94 %-100 %] 98 %  BP: ()/(54-93) 149/72     Weight: 81.2 kg (179 lb 0.2 oz)  Body mass index is 24.28 kg/(m^2).    Physical Exam   Constitutional: He is oriented to person, place, and time. He appears well-developed and well-nourished. No distress.   HENT:   Head: Normocephalic.   Mouth/Throat: Oropharynx is clear and moist.   Eyes: EOM are normal.   Pale conjunctivae   Neck: Normal range of motion. Neck supple. No JVD present. No thyromegaly present.    Cardiovascular: Normal rate, regular rhythm, normal heart sounds and intact distal pulses.    No murmur heard.  Pulmonary/Chest: Effort normal and breath sounds normal. No respiratory distress.   Abdominal: Soft. He exhibits no distension and no mass. There is no tenderness.   Musculoskeletal: Normal range of motion. He exhibits no edema.   Lymphadenopathy:     He has no cervical adenopathy.   Neurological: He is alert and oriented to person, place, and time.   Skin: Skin is warm and dry. He is not diaphoretic. There is pallor.   Psychiatric: He has a normal mood and affect. His behavior is normal.        Significant Labs:   CBC:   Recent Labs  Lab 01/20/17  1438 01/21/17  0436   WBC 10.73 11.25   HGB 6.0* 7.2*   HCT 21.4* 25.5*   * 362*     CMP:   Recent Labs  Lab 01/20/17  1438 01/21/17  0436    140   K 3.8 4.5    110   CO2 24 22*   GLU 88 89   BUN 14 10   CREATININE 0.9 0.8   CALCIUM 8.5* 8.5*   PROT 6.7 6.8   ALBUMIN 3.9 3.7   BILITOT 0.3 0.6   ALKPHOS 76 71   AST 13 14   ALT 12 12   ANIONGAP 7* 8   EGFRNONAA >60 >60     Coagulation: No results for input(s): INR, APTT in the last 48 hours.    Invalid input(s): PT    Significant Imaging: I have reviewed all pertinent imaging results/findings within the past 24 hours.    Assessment/Plan:     Symptomatic anemia  + antibodies to typed blood which delayed transfusion  On second unit of blood, for total of 2 units PRBCs  Repeat CBC after completion of second unit  H/o SB GIB - on protonix     GI and hematology consulted for any further recs, all previous studies and labs done elsewhere      Syncope  Secondary to severe anemia, see above  Will assess for fall risk prior to dc home       Iron deficiency anemia due to chronic blood loss  See above      Hypertension, benign  Resume home medications amlodipine and benazepril      Postlaminectomy syndrome  Gabapentin and lortab PRN       Cervical sprain  Pain control with home medications       Tobacco  abuse  Smoking cessation discussed with patient  Nicotine patch       Thoracic or lumbosacral neuritis or radiculitis, unspecified        VTE Risk Mitigation         Ordered     Medium Risk of VTE  Once      01/20/17 2042     Place sequential compression device  Until discontinued      01/20/17 2042        Nahomi Edgar MD  Department of Hospital Medicine   [facility]

## 2017-01-21 NOTE — CONSULTS
Gastroenterology    CC: Anemia    HPI 71 y.o. male with painless, chronic, worsening, microcytic, moderate severity anemia requiring PRBC.  [place] and had recent colon exam.  Apparently had SB capsule done in recent years with bleeding source found (presume angioectasia) and managed conservatively with iron by Dr. Spain/Hematologist.  No melena, BRBPR, hematochezia.  No abd pain.  No heavy NSAID use.  No wt loss      Past Medical History   Diagnosis Date    Back pain     Hypertension     Hypertension, benign 6/11/2013     Diagnosed 1983.         Past Surgical History   Procedure Laterality Date    Back surgery         Social History  Social History   Substance Use Topics    Smoking status: Current Every Day Smoker     Packs/day: 1.00     Years: 50.00    Smokeless tobacco: None    Alcohol use Yes      Comment: Socially.   No illicits    No FH colon cancer    Review of Systems  General ROS: negative for chills, fever or weight loss  Psychological ROS: negative for hallucination, depression or suicidal ideation  Ophthalmic ROS: negative for blurry vision, photophobia or eye pain  ENT ROS: negative for epistaxis, sore throat or rhinorrhea  Respiratory ROS: no cough, wheezing  Cardiovascular ROS: no chest pain or palpitations  Gastrointestinal ROS: no abdominal pain, change in bowel habits, or black/ bloody stools  Genito-Urinary ROS: no dysuria, trouble voiding, or hematuria  Musculoskeletal ROS: negative for gait disturbance or muscular weakness  Neurological ROS: no syncope or seizures; no ataxia  Dermatological ROS: negative for pruritis, rash and jaundice    Physical Examination  Visit Vitals    BP (!) 149/72    Pulse 87    Temp 97.5 °F (36.4 °C) (Oral)    Resp 18    Ht 6' (1.829 m)    Wt 81.7 kg (180 lb 0.1 oz)    SpO2 98%    BMI 24.41 kg/m2     General appearance: alert, cooperative, no distress  HENT: Normocephalic, atraumatic, neck symmetrical, no nasal discharge   Eyes:  conjunctivae/corneas clear, PERRL, EOM's intact  Lungs: clear to auscultation bilaterally, no dullness to percussion bilaterally  Heart: regular rate and rhythm without rub; no displacement of the PMI   Abdomen: soft, non-tender; bowel sounds normoactive; no organomegaly  Extremities: extremities symmetric; no clubbing, cyanosis, or edema  Integument: Skin color, texture, turgor normal; no rashes; hair distrubution normal  Neurologic: Alert and oriented X 3, MAEW  Psychiatric: no pressured speech; normal affect; no evidence of impaired cognition     Labs:  Hb 6  MCV low    Imaging:  CXR - stable    Assessment:     Recurrent, chronic anemia possible related to SB angioectasia(s), given his reported hx.  Followed by Dr. Spain, who has done recent colonoscopy. No overt blood loss and VSS.  No anticoagulation.     Plan:   - PRBC in process  - No further GI evaluation planned while inpt here.  Needs to follow up with Dr. Spain next week to review records and discuss next step.   - I will arrange his follow up - call with questions

## 2017-01-21 NOTE — PLAN OF CARE
Problem: Patient Care Overview  Goal: Plan of Care Review  Outcome: Ongoing (interventions implemented as appropriate)  Blood transfusion complete patient resting comfortable, no s.s acute distress. No falls, trauma or injury this shift. Lab values continue to trend to the normal range. Continue with plan of care.

## 2017-01-21 NOTE — PROGRESS NOTES
Unit #2 of PRBCs started patient tolerating without acute distress. bp-150/70, p=79, r=19 t=98.5. Unable to get into blood flow sheet to document. ,

## 2017-01-22 NOTE — PROGRESS NOTES
Discharge instructions verbalized with patient, no scripts given. Patient ambulated to ED exit with serena lee. Family to  by ed dept.

## 2017-02-16 NOTE — DISCHARGE SUMMARY
[facility] Hospital Medicine  Discharge Summary      Patient Name: Ramakrishna Latham Jr.  MRN: [id number]  Admission Date: 1/20/2017  Hospital Length of Stay: 1 days  Discharge Date and Time: 1/21/2017  Attending Physician: Nahomi Edgar  Discharging Provider: Nahomi Edgar MD  Primary Care Provider: Robert Sosa MD      HPI:   70 yo male with HTN, chronic back pain, chronic anemia with previous small bowel GIB presented from home with c/o feeling dizzy and lightheaded with syncope.  He has recently noticed feeling more fatigue and LE weakness. He sees a GI doctor and recently had colonoscopy that was normal.  He had EGD and pill endoscopy 3 years ago elsewhere and was told he had a small bowel bleed, no intervention done at that time. He was placed on iron supplements but not recently taking them.  He also sees a hematologist and intermittently gets IV iron infusions. He has never received a blood transfusion for his chronic anemia.  He denies NSAIDs but does take baby aspirin.  He endorses SOB with activity and no chest pain, no melena or hematochezia. + unintentional weight loss. On admit his h/h was 6.0/21.4 respectively with + schistocytes present.     * No surgery found *      Indwelling Lines/Drains at time of discharge:   Lines/Drains/Airways          No matching active lines, drains, or airways        Hospital Course:   Pt admitted with symptomatic anemia and transfused two units of PRBCs. Symptoms resolved. He was seen by GI and previous work up as stated above was reviewed. He follows with Dr. Spain and will plan to have close follow up.  Hematology also saw patient and reviewed treatment given by hematologist at [facility]. He will follow up as scheduled and continue IV iron infusion.  No acute signs of bleeding on this admission.      Consults:   Consults         Status Ordering Provider     Inpatient consult to Gastroenterology  Once     Provider:  Virgil Johnson MD    Completed  JUAN RIOS     Inpatient consult to Hematology/Oncology - [facility]  Once     Provider:  Doreen Figueroa MD    Completed JUAN RIOS          Pending Diagnostic Studies:     None        Final Active Diagnoses:    Diagnosis Date Noted POA    PRINCIPAL PROBLEM:  Symptomatic anemia [D64.9] 01/21/2017 Yes    Syncope [R55] 06/11/2013 Yes    Iron deficiency anemia due to chronic blood loss [D50.0] 01/21/2017 Yes    Hypertension, benign [I10] 06/11/2013 Yes    Postlaminectomy syndrome [M96.1] 11/04/2013 Yes    Cervical sprain [S13.9XXA] 07/09/2013 Yes    Tobacco abuse [Z72.0] 01/21/2017 Yes      Problems Resolved During this Admission:    Diagnosis Date Noted Date Resolved POA      * Symptomatic anemia  Follow up hematology and GI as scheduled  S/p 2 units of blood and hemoglobin >8 on dc home       Syncope  Secondary to severe anemia, see above  Assessed for fall risk and clear for dc home       Iron deficiency anemia due to chronic blood loss  See above      Hypertension, benign  Resume home medications amlodipine and benazepril      Postlaminectomy syndrome  Gabapentin and lortab PRN       Cervical sprain  Pain control with home medications       Tobacco abuse  Smoking cessation discussed with patient  Nicotine patch             Discharged Condition: good    Disposition: Home or Self Care    Follow Up:  Follow-up Information     Follow up with Robert Sosa MD In 1 week.    Specialty:  Internal Medicine    Contact information:    [address] [place] [id number] [phone]          Patient Instructions:     Diet Cardiac     Activity as tolerated     Call MD for:  temperature >100.4     Call MD for:  persistent nausea and vomiting or diarrhea     Call MD for:  persistent dizziness, light-headedness, or visual disturbances       Medications:  Reconciled Home Medications:   Discharge Medication List as of 1/21/2017  5:49 PM      START taking these medications    Details   nicotine  (NICODERM CQ) 21 mg/24 hr Place 1 patch onto the skin once daily., Starting 1/21/2017, Until Discontinued, OTC         CONTINUE these medications which have NOT CHANGED    Details   amlodipine-benazepril 5-20 mg (LOTREL) 5-20 mg per capsule Starting 5/29/2013, Until Discontinued, Historical Med      baclofen (LIORESAL) 10 MG tablet Starting 4/11/2013, Until Discontinued, Historical Med      CIALIS 20 mg Tab Starting 9/30/2013, Until Discontinued, Historical Med      gabapentin (NEURONTIN) 300 MG capsule Take 300 mg by mouth 2 (two) times daily., Until Discontinued, Historical Med      hydrocodone-acetaminophen 5-325mg (NORCO) 5-325 mg per tablet Starting 7/8/2014, Until Discontinued, Historical Med         STOP taking these medications       VIAGRA 100 mg tablet Comments:   Reason for Stopping:         aspirin (ECOTRIN) 81 MG EC tablet Comments:   Reason for Stopping:         diazepam (VALIUM) 5 MG tablet Comments:   Reason for Stopping:         oxycodone-acetaminophen (PERCOCET) 7.5-500 mg per tablet Comments:   Reason for Stopping:         tramadol (ULTRAM) 50 mg tablet Comments:   Reason for Stopping:         UNKNOWN TO PATIENT Comments:   Reason for Stopping:             Time spent on the discharge of patient: 30 minutes    Nahomi Edgar MD  Department of Hospital Medicine  [facility]

## 2017-10-19 ENCOUNTER — TELEPHONE (OUTPATIENT)
Dept: RADIOLOGY | Facility: HOSPITAL | Age: 72
End: 2017-10-19

## 2018-01-16 ENCOUNTER — HOSPITAL ENCOUNTER (OUTPATIENT)
Dept: RADIOLOGY | Facility: HOSPITAL | Age: 73
Discharge: HOME | End: 2018-01-16
Attending: INTERNAL MEDICINE
Payer: MEDICARE

## 2018-01-16 DIAGNOSIS — R91.1 SOLITARY PULMONARY NODULE: ICD-10-CM

## 2018-01-16 LAB — POCT GLUCOSE: 88 MG/DL (ref 70–110)

## 2018-01-16 PROCEDURE — 78815 PET IMAGE W/CT SKULL-THIGH: CPT | Mod: TC

## 2018-01-16 PROCEDURE — 78815 PET IMAGE W/CT SKULL-THIGH: CPT | Mod: 26,PS,, | Performed by: RADIOLOGY

## 2018-01-16 PROCEDURE — A9552 F18 FDG: HCPCS

## 2018-09-12 NOTE — H&P (VIEW-ONLY)
History & Physical    SUBJECTIVE:     History of Present Illness:  Patient is a 73 y.o. male with history of suspicious lesion left lobe of thyroid on image guided biopsy.  Pt also with suspicious lesion left upper lobe of lung on biopsy. History of smoking. Pt with multiple large lymph  nodes in neck, submandibular area. Pt evaluated by thoracic surgery and Med/Onc con census  is to   proceed with thyroid lobectomy.       Review of patient's allergies indicates:  No Known Allergies    Current Outpatient Medications   Medication Sig Dispense Refill    amlodipine-benazepril 5-20 mg (LOTREL) 5-20 mg per capsule       baclofen (LIORESAL) 10 MG tablet       CIALIS 20 mg Tab       gabapentin (NEURONTIN) 300 MG capsule Take 300 mg by mouth 2 (two) times daily.      hydrocodone-acetaminophen 5-325mg (NORCO) 5-325 mg per tablet   1    nicotine (NICODERM CQ) 21 mg/24 hr Place 1 patch onto the skin once daily.  0     No current facility-administered medications for this visit.        Past Medical History:   Diagnosis Date    Back pain     Hypertension     Hypertension, benign 6/11/2013    Diagnosed 1983.     Past Surgical History:   Procedure Laterality Date    BACK SURGERY       History reviewed. No pertinent family history.  Social History     Tobacco Use    Smoking status: Current Every Day Smoker     Packs/day: 1.00     Years: 50.00     Pack years: 50.00    Smokeless tobacco: Never Used   Substance Use Topics    Alcohol use: Yes     Comment: Socially.    Drug use: No        Review of Systems:  Review of Systems   HENT: Negative.    Respiratory: Negative.    Cardiovascular: Negative.    Gastrointestinal: Negative.    Genitourinary: Negative.    Musculoskeletal: Negative.    Skin: Negative.        OBJECTIVE:     Vital Signs (Most Recent)  Pulse: 90 (09/12/18 1411)  BP: (!) 142/80 (09/12/18 1411)  6' (1.829 m)  77.1 kg (170 lb)     Physical Exam:  Physical Exam   Constitutional: He is oriented to person, place,  and time. He appears well-developed and well-nourished.   HENT:   Head: Normocephalic and atraumatic.   Eyes: EOM are normal.   Neck: Trachea normal and normal range of motion. Neck supple. Thyroid mass present.   Cardiovascular: Normal rate, regular rhythm and normal heart sounds.   Pulmonary/Chest: Effort normal and breath sounds normal.   Abdominal: Soft. Bowel sounds are normal.   Musculoskeletal: Normal range of motion.   Neurological: He is alert and oriented to person, place, and time.   Skin: Skin is warm and dry.       Laboratory      Diagnostic Results:    ASSESSMENT/PLAN:     Left thyroid nodule     PLAN:Plan     Thyroid lobectomy, possible total thyroidectomy

## 2018-09-25 ENCOUNTER — ANESTHESIA EVENT (OUTPATIENT)
Dept: SURGERY | Facility: OTHER | Age: 73
End: 2018-09-25
Payer: MEDICARE

## 2018-09-25 ENCOUNTER — HOSPITAL ENCOUNTER (OUTPATIENT)
Dept: PREADMISSION TESTING | Facility: OTHER | Age: 73
Discharge: HOME | End: 2018-09-25
Attending: SPECIALIST
Payer: MEDICARE

## 2018-09-25 VITALS
DIASTOLIC BLOOD PRESSURE: 65 MMHG | HEART RATE: 77 BPM | OXYGEN SATURATION: 99 % | TEMPERATURE: 99 F | HEIGHT: 72 IN | WEIGHT: 171 LBS | BODY MASS INDEX: 23.16 KG/M2 | SYSTOLIC BLOOD PRESSURE: 137 MMHG

## 2018-09-25 DIAGNOSIS — Z01.818 PREOPERATIVE TESTING: ICD-10-CM

## 2018-09-25 LAB
ANION GAP SERPL CALC-SCNC: 6 MMOL/L
ANISOCYTOSIS BLD QL SMEAR: ABNORMAL
BASOPHILS # BLD AUTO: 0.08 K/UL
BASOPHILS NFR BLD: 1.5 %
BUN SERPL-MCNC: 10 MG/DL
CALCIUM SERPL-MCNC: 8.9 MG/DL
CHLORIDE SERPL-SCNC: 107 MMOL/L
CO2 SERPL-SCNC: 26 MMOL/L
CREAT SERPL-MCNC: 0.7 MG/DL
DIFFERENTIAL METHOD: ABNORMAL
EOSINOPHIL # BLD AUTO: 0.3 K/UL
EOSINOPHIL NFR BLD: 6 %
ERYTHROCYTE [DISTWIDTH] IN BLOOD BY AUTOMATED COUNT: 24 %
EST. GFR  (AFRICAN AMERICAN): >60 ML/MIN/1.73 M^2
EST. GFR  (NON AFRICAN AMERICAN): >60 ML/MIN/1.73 M^2
GLUCOSE SERPL-MCNC: 83 MG/DL
HCT VFR BLD AUTO: 29.4 %
HGB BLD-MCNC: 8.6 G/DL
HYPOCHROMIA BLD QL SMEAR: ABNORMAL
LYMPHOCYTES # BLD AUTO: 1.2 K/UL
LYMPHOCYTES NFR BLD: 22.2 %
MCH RBC QN AUTO: 20.2 PG
MCHC RBC AUTO-ENTMCNC: 29.3 G/DL
MCV RBC AUTO: 69 FL
MONOCYTES # BLD AUTO: 0.5 K/UL
MONOCYTES NFR BLD: 8.8 %
NEUTROPHILS # BLD AUTO: 3.3 K/UL
NEUTROPHILS NFR BLD: 61.5 %
OVALOCYTES BLD QL SMEAR: ABNORMAL
PLATELET # BLD AUTO: 281 K/UL
PLATELET BLD QL SMEAR: ABNORMAL
PMV BLD AUTO: ABNORMAL FL
POIKILOCYTOSIS BLD QL SMEAR: SLIGHT
POLYCHROMASIA BLD QL SMEAR: ABNORMAL
POTASSIUM SERPL-SCNC: 4.3 MMOL/L
RBC # BLD AUTO: 4.26 M/UL
SCHISTOCYTES BLD QL SMEAR: ABNORMAL
SCHISTOCYTES BLD QL SMEAR: PRESENT
SODIUM SERPL-SCNC: 139 MMOL/L
WBC # BLD AUTO: 5.36 K/UL

## 2018-09-25 PROCEDURE — 85025 COMPLETE CBC W/AUTO DIFF WBC: CPT

## 2018-09-25 PROCEDURE — 93010 ELECTROCARDIOGRAM REPORT: CPT | Mod: ,,, | Performed by: INTERNAL MEDICINE

## 2018-09-25 PROCEDURE — 36415 COLL VENOUS BLD VENIPUNCTURE: CPT

## 2018-09-25 PROCEDURE — 80048 BASIC METABOLIC PNL TOTAL CA: CPT

## 2018-09-25 PROCEDURE — 93005 ELECTROCARDIOGRAM TRACING: CPT

## 2018-09-25 RX ORDER — FAMOTIDINE 20 MG/1
20 TABLET, FILM COATED ORAL
Status: CANCELLED | OUTPATIENT
Start: 2018-09-25 | End: 2018-09-25

## 2018-09-25 RX ORDER — MIDAZOLAM HYDROCHLORIDE 5 MG/ML
5 INJECTION INTRAMUSCULAR; INTRAVENOUS
Status: CANCELLED | OUTPATIENT
Start: 2018-09-25

## 2018-09-25 RX ORDER — OXYCODONE HYDROCHLORIDE 5 MG/1
5 TABLET ORAL ONCE AS NEEDED
Status: CANCELLED | OUTPATIENT
Start: 2018-09-25 | End: 2018-09-25

## 2018-09-25 RX ORDER — LIDOCAINE HYDROCHLORIDE 10 MG/ML
0.5 INJECTION, SOLUTION EPIDURAL; INFILTRATION; INTRACAUDAL; PERINEURAL ONCE
Status: CANCELLED | OUTPATIENT
Start: 2018-09-25 | End: 2018-09-25

## 2018-09-25 RX ORDER — SODIUM CHLORIDE, SODIUM LACTATE, POTASSIUM CHLORIDE, CALCIUM CHLORIDE 600; 310; 30; 20 MG/100ML; MG/100ML; MG/100ML; MG/100ML
INJECTION, SOLUTION INTRAVENOUS CONTINUOUS
Status: CANCELLED | OUTPATIENT
Start: 2018-09-25

## 2018-09-25 RX ORDER — DIAZEPAM 2 MG/1
2 TABLET ORAL DAILY PRN
COMMUNITY

## 2018-09-25 NOTE — ANESTHESIA PREPROCEDURE EVALUATION
09/25/2018  Ramakrishna Latham Jr. is a 73 y.o., male.    Anesthesia Evaluation    I have reviewed the Patient Summary Reports.    I have reviewed the Nursing Notes.   I have reviewed the Medications.     Review of Systems  Anesthesia Hx:  No problems with previous Anesthesia    Social:  Smoker, Social Alcohol Use    Hematology/Oncology:     Oncology Normal    -- Anemia:   EENT/Dental:EENT/Dental Normal   Cardiovascular:   Exercise tolerance: good Hypertension, well controlled    Pulmonary:  Pulmonary Normal    Renal/:  Renal/ Normal     Hepatic/GI:  Hepatic/GI Normal    Musculoskeletal:   Arthritis   Spine Disorders: cervical and lumbar Chronic Pain    Neurological:   Syncope due to anemia.   Endocrine:  Endocrine Normal    Dermatological:  Skin Normal    Psych:  Psychiatric Normal           Physical Exam  General:  Well nourished    Airway/Jaw/Neck:  Airway Findings: Mouth Opening: Normal Tongue: Normal  General Airway Assessment: Adult, Average  Mallampati: I  TM Distance: Normal, at least 6 cm  Jaw/Neck Findings:  Neck ROM: Normal ROM      Dental:  Dental Findings: Upper Dentures, Periodontal disease, Severe        Mental Status:  Mental Status Findings:  Cooperative, Alert and Oriented         Anesthesia Plan  Type of Anesthesia, risks & benefits discussed:  Anesthesia Type:  general  Patient's Preference:   Intra-op Monitoring Plan: standard ASA monitors  Intra-op Monitoring Plan Comments:   Post Op Pain Control Plan: per primary service following discharge from PACU  Post Op Pain Control Plan Comments:   Induction:    Beta Blocker:         Informed Consent: Patient understands risks and agrees with Anesthesia plan.  Questions answered. Anesthesia consent signed with patient.  ASA Score: 2     Day of Surgery Review of History & Physical:    H&P update referred to the surgeon.     Anesthesia Plan  Notes: Labs ordered. Will seek medical clearance.        Ready For Surgery From Anesthesia Perspective.

## 2018-09-25 NOTE — DISCHARGE INSTRUCTIONS
PRE-ADMIT TESTING -  [phone]    [address]  [place] [facility]          Your surgery has been scheduled at [facility]. We are pleased to have the opportunity to serve you. For Further Information please call [phone].    On the day of surgery please report to the Information Desk on the 1st floor.    · CONTACT YOUR PHYSICIAN'S OFFICE THE DAY PRIOR TO YOUR SURGERY TO OBTAIN YOUR ARRIVAL TIME.     · The evening before surgery do not eat anything after 9 p.m. ( this includes hard candy, chewing gum and mints).  You may only have GATORADE, POWERADE AND WATER  from 9 p.m. until you leave your home.   DO NOT DRINK ANY LIQUIDS ON THE WAY TO THE HOSPITAL.      SPECIAL MEDICATION INSTRUCTIONS: TAKE medications checked off by the Anesthesiologist on your Medication List.    Angiogram Patients: Take medications as instructed by your physician, including aspirin.     Surgery Patients:    If you take ASPIRIN - Your PHYSICIAN/SURGEON will need to inform you IF/OR when you need to stop taking aspirin prior to your surgery.     Do Not take any medications containing IBUPROFEN.  Do Not Wear any make-up or dark nail polish   (especially eye make-up) to surgery. If you come to surgery with makeup on you will be required to remove the makeup or nail polish.    Do not shave your surgical area at least 5 days prior to your surgery. The surgical prep will be performed at the hospital according to Infection Control regulations.    Leave all valuables at home.   Do Not wear any jewelry or watches, including any metal in body piercings.  Contact Lens must be removed before surgery. Either do not wear the contact lens or bring a case and solution for storage.  Please bring a container for eyeglasses or dentures as required.  Bring any paperwork your physician has provided, such as consent forms,  history and physicals, doctor's orders, etc.   Bring comfortable clothes that are loose fitting to wear upon  discharge. Take into consideration the type of surgery being performed.  Maintain your diet as advised per your physician the day prior to surgery.      Adequate rest the night before surgery is advised.   Park in the Parking lot behind the hospital or in the [facility] across the street from the parking lot. Parking is complimentary.  If you will be discharged the same day as your procedure, please arrange for a responsible adult to drive you home or to accompany you if traveling by taxi.   YOU WILL NOT BE PERMITTED TO DRIVE OR TO LEAVE THE HOSPITAL ALONE AFTER SURGERY.   It is strongly recommended that you arrange for someone to remain with you for the first 24 hrs following your surgery.       Thank you for your cooperation.  The Staff of [facility].        Bathing Instructions                                                                 Please shower the evening before and morning of your procedure with    ANTIBACTERIAL SOAP. ( DIAL, etc )  Concentrate on the surgical area   for at least 3 minutes and rinse completely. Dry off as usual.   Do not use any deodorant, powder, body lotions, perfume, after shave or    cologne.

## 2018-09-27 ENCOUNTER — ANESTHESIA (OUTPATIENT)
Dept: SURGERY | Facility: OTHER | Age: 73
End: 2018-09-27
Payer: MEDICARE

## 2018-09-27 ENCOUNTER — HOSPITAL ENCOUNTER (OUTPATIENT)
Facility: OTHER | Age: 73
Discharge: HOME | End: 2018-09-29
Attending: SPECIALIST | Admitting: SPECIALIST
Payer: MEDICARE

## 2018-09-27 DIAGNOSIS — E07.9 THYROID MASS: ICD-10-CM

## 2018-09-27 DIAGNOSIS — E07.89 THYROID MASS OF UNCLEAR ETIOLOGY: ICD-10-CM

## 2018-09-27 DIAGNOSIS — D50.0 IRON DEFICIENCY ANEMIA DUE TO CHRONIC BLOOD LOSS: Primary | ICD-10-CM

## 2018-09-27 LAB
ABO + RH BLD: NORMAL
BLD GP AB SCN CELLS X3 SERPL QL: NORMAL

## 2018-09-27 PROCEDURE — 00320 ANES ALL PX NECK NOS 1YR/>: CPT | Performed by: SPECIALIST

## 2018-09-27 PROCEDURE — 27201423 OPTIME MED/SURG SUP & DEVICES STERILE SUPPLY: Performed by: SPECIALIST

## 2018-09-27 PROCEDURE — 88305 TISSUE EXAM BY PATHOLOGIST: CPT | Mod: 26,,, | Performed by: PATHOLOGY

## 2018-09-27 PROCEDURE — 86905 BLOOD TYPING RBC ANTIGENS: CPT

## 2018-09-27 PROCEDURE — 71000039 HC RECOVERY, EACH ADD'L HOUR: Performed by: SPECIALIST

## 2018-09-27 PROCEDURE — 36000706: Performed by: SPECIALIST

## 2018-09-27 PROCEDURE — 88307 TISSUE EXAM BY PATHOLOGIST: CPT | Mod: 26,,, | Performed by: PATHOLOGY

## 2018-09-27 PROCEDURE — 37000008 HC ANESTHESIA 1ST 15 MINUTES: Performed by: SPECIALIST

## 2018-09-27 PROCEDURE — 25000003 PHARM REV CODE 250: Performed by: SPECIALIST

## 2018-09-27 PROCEDURE — 36415 COLL VENOUS BLD VENIPUNCTURE: CPT

## 2018-09-27 PROCEDURE — 88307 TISSUE EXAM BY PATHOLOGIST: CPT | Mod: 59 | Performed by: PATHOLOGY

## 2018-09-27 PROCEDURE — 63600175 PHARM REV CODE 636 W HCPCS: Performed by: NURSE ANESTHETIST, CERTIFIED REGISTERED

## 2018-09-27 PROCEDURE — 86870 RBC ANTIBODY IDENTIFICATION: CPT

## 2018-09-27 PROCEDURE — 25000003 PHARM REV CODE 250: Performed by: NURSE ANESTHETIST, CERTIFIED REGISTERED

## 2018-09-27 PROCEDURE — 88305 TISSUE EXAM BY PATHOLOGIST: CPT | Performed by: PATHOLOGY

## 2018-09-27 PROCEDURE — 71000033 HC RECOVERY, INTIAL HOUR: Performed by: SPECIALIST

## 2018-09-27 PROCEDURE — 63600175 PHARM REV CODE 636 W HCPCS: Performed by: SPECIALIST

## 2018-09-27 PROCEDURE — 88331 PATH CONSLTJ SURG 1 BLK 1SPC: CPT | Mod: 26,,, | Performed by: PATHOLOGY

## 2018-09-27 PROCEDURE — 36000707: Performed by: SPECIALIST

## 2018-09-27 PROCEDURE — 27000221 HC OXYGEN, UP TO 24 HOURS

## 2018-09-27 PROCEDURE — 37000009 HC ANESTHESIA EA ADD 15 MINS: Performed by: SPECIALIST

## 2018-09-27 PROCEDURE — 94761 N-INVAS EAR/PLS OXIMETRY MLT: CPT

## 2018-09-27 PROCEDURE — 86901 BLOOD TYPING SEROLOGIC RH(D): CPT

## 2018-09-27 PROCEDURE — A4216 STERILE WATER/SALINE, 10 ML: HCPCS | Performed by: SPECIALIST

## 2018-09-27 RX ORDER — SODIUM CHLORIDE, SODIUM LACTATE, POTASSIUM CHLORIDE, CALCIUM CHLORIDE 600; 310; 30; 20 MG/100ML; MG/100ML; MG/100ML; MG/100ML
INJECTION, SOLUTION INTRAVENOUS CONTINUOUS
Status: DISCONTINUED | OUTPATIENT
Start: 2018-09-27 | End: 2018-09-27

## 2018-09-27 RX ORDER — EPHEDRINE SULFATE 50 MG/ML
INJECTION, SOLUTION INTRAVENOUS
Status: DISCONTINUED | OUTPATIENT
Start: 2018-09-27 | End: 2018-09-27

## 2018-09-27 RX ORDER — LIDOCAINE HCL/PF 100 MG/5ML
SYRINGE (ML) INTRAVENOUS
Status: DISCONTINUED | OUTPATIENT
Start: 2018-09-27 | End: 2018-09-27

## 2018-09-27 RX ORDER — MIDAZOLAM HYDROCHLORIDE 5 MG/ML
5 INJECTION INTRAMUSCULAR; INTRAVENOUS
Status: DISCONTINUED | OUTPATIENT
Start: 2018-09-27 | End: 2018-09-27 | Stop reason: HOSPADM

## 2018-09-27 RX ORDER — HYDROMORPHONE HYDROCHLORIDE 2 MG/ML
0.4 INJECTION, SOLUTION INTRAMUSCULAR; INTRAVENOUS; SUBCUTANEOUS EVERY 5 MIN PRN
Status: DISCONTINUED | OUTPATIENT
Start: 2018-09-27 | End: 2018-09-27 | Stop reason: HOSPADM

## 2018-09-27 RX ORDER — MEPERIDINE HYDROCHLORIDE 50 MG/ML
12.5 INJECTION INTRAMUSCULAR; INTRAVENOUS; SUBCUTANEOUS ONCE AS NEEDED
Status: DISCONTINUED | OUTPATIENT
Start: 2018-09-27 | End: 2018-09-27 | Stop reason: HOSPADM

## 2018-09-27 RX ORDER — FENTANYL CITRATE 50 UG/ML
INJECTION, SOLUTION INTRAMUSCULAR; INTRAVENOUS
Status: DISCONTINUED | OUTPATIENT
Start: 2018-09-27 | End: 2018-09-27

## 2018-09-27 RX ORDER — DIPHENHYDRAMINE HYDROCHLORIDE 50 MG/ML
25 INJECTION INTRAMUSCULAR; INTRAVENOUS EVERY 6 HOURS PRN
Status: DISCONTINUED | OUTPATIENT
Start: 2018-09-27 | End: 2018-09-27 | Stop reason: HOSPADM

## 2018-09-27 RX ORDER — FENTANYL CITRATE 50 UG/ML
25 INJECTION, SOLUTION INTRAMUSCULAR; INTRAVENOUS EVERY 5 MIN PRN
Status: COMPLETED | OUTPATIENT
Start: 2018-09-27 | End: 2018-09-27

## 2018-09-27 RX ORDER — PROPOFOL 10 MG/ML
VIAL (ML) INTRAVENOUS
Status: DISCONTINUED | OUTPATIENT
Start: 2018-09-27 | End: 2018-09-27

## 2018-09-27 RX ORDER — ONDANSETRON 8 MG/1
8 TABLET, ORALLY DISINTEGRATING ORAL EVERY 8 HOURS PRN
Status: DISCONTINUED | OUTPATIENT
Start: 2018-09-27 | End: 2018-09-29 | Stop reason: HOSPADM

## 2018-09-27 RX ORDER — OXYCODONE HYDROCHLORIDE 5 MG/1
10 TABLET ORAL EVERY 4 HOURS PRN
Status: DISCONTINUED | OUTPATIENT
Start: 2018-09-27 | End: 2018-09-29 | Stop reason: HOSPADM

## 2018-09-27 RX ORDER — ACETAMINOPHEN 325 MG/1
650 TABLET ORAL EVERY 8 HOURS PRN
Status: DISCONTINUED | OUTPATIENT
Start: 2018-09-27 | End: 2018-09-29 | Stop reason: HOSPADM

## 2018-09-27 RX ORDER — CEFAZOLIN SODIUM 2 G/50ML
2 SOLUTION INTRAVENOUS
Status: COMPLETED | OUTPATIENT
Start: 2018-09-27 | End: 2018-09-27

## 2018-09-27 RX ORDER — OXYCODONE HYDROCHLORIDE 5 MG/1
5 TABLET ORAL ONCE AS NEEDED
Status: DISCONTINUED | OUTPATIENT
Start: 2018-09-27 | End: 2018-09-27 | Stop reason: HOSPADM

## 2018-09-27 RX ORDER — BENAZEPRIL HYDROCHLORIDE 10 MG/1
20 TABLET ORAL DAILY
Status: DISCONTINUED | OUTPATIENT
Start: 2018-09-28 | End: 2018-09-29 | Stop reason: HOSPADM

## 2018-09-27 RX ORDER — LIDOCAINE HYDROCHLORIDE 10 MG/ML
1 INJECTION, SOLUTION EPIDURAL; INFILTRATION; INTRACAUDAL; PERINEURAL ONCE
Status: DISCONTINUED | OUTPATIENT
Start: 2018-09-27 | End: 2018-09-29 | Stop reason: HOSPADM

## 2018-09-27 RX ORDER — DIAZEPAM 2 MG/1
2 TABLET ORAL DAILY PRN
Status: DISCONTINUED | OUTPATIENT
Start: 2018-09-27 | End: 2018-09-29 | Stop reason: HOSPADM

## 2018-09-27 RX ORDER — OXYCODONE HYDROCHLORIDE 5 MG/1
5 TABLET ORAL
Status: DISCONTINUED | OUTPATIENT
Start: 2018-09-27 | End: 2018-09-27 | Stop reason: HOSPADM

## 2018-09-27 RX ORDER — AMLODIPINE BESYLATE 5 MG/1
5 TABLET ORAL DAILY
Status: DISCONTINUED | OUTPATIENT
Start: 2018-09-28 | End: 2018-09-29 | Stop reason: HOSPADM

## 2018-09-27 RX ORDER — GLYCOPYRROLATE 0.2 MG/ML
INJECTION INTRAMUSCULAR; INTRAVENOUS
Status: DISCONTINUED | OUTPATIENT
Start: 2018-09-27 | End: 2018-09-27

## 2018-09-27 RX ORDER — AMLODIPINE AND BENAZEPRIL HYDROCHLORIDE 5; 20 MG/1; MG/1
1 CAPSULE ORAL DAILY
Status: DISCONTINUED | OUTPATIENT
Start: 2018-09-28 | End: 2018-09-27

## 2018-09-27 RX ORDER — FAMOTIDINE 20 MG/1
20 TABLET, FILM COATED ORAL
Status: COMPLETED | OUTPATIENT
Start: 2018-09-27 | End: 2018-09-27

## 2018-09-27 RX ORDER — LIDOCAINE HYDROCHLORIDE 10 MG/ML
0.5 INJECTION, SOLUTION EPIDURAL; INFILTRATION; INTRACAUDAL; PERINEURAL ONCE
Status: DISCONTINUED | OUTPATIENT
Start: 2018-09-27 | End: 2018-09-27 | Stop reason: HOSPADM

## 2018-09-27 RX ORDER — PHENYLEPHRINE HYDROCHLORIDE 10 MG/ML
INJECTION INTRAVENOUS
Status: DISCONTINUED | OUTPATIENT
Start: 2018-09-27 | End: 2018-09-27

## 2018-09-27 RX ORDER — RAMELTEON 8 MG/1
8 TABLET ORAL NIGHTLY PRN
Status: DISCONTINUED | OUTPATIENT
Start: 2018-09-27 | End: 2018-09-29 | Stop reason: HOSPADM

## 2018-09-27 RX ORDER — DIPHENHYDRAMINE HYDROCHLORIDE 50 MG/ML
25 INJECTION INTRAMUSCULAR; INTRAVENOUS EVERY 4 HOURS PRN
Status: DISCONTINUED | OUTPATIENT
Start: 2018-09-27 | End: 2018-09-29 | Stop reason: HOSPADM

## 2018-09-27 RX ORDER — ACETAMINOPHEN 325 MG/1
650 TABLET ORAL EVERY 4 HOURS PRN
Status: DISCONTINUED | OUTPATIENT
Start: 2018-09-27 | End: 2018-09-29 | Stop reason: HOSPADM

## 2018-09-27 RX ORDER — SODIUM CHLORIDE 9 MG/ML
INJECTION, SOLUTION INTRAVENOUS CONTINUOUS
Status: DISCONTINUED | OUTPATIENT
Start: 2018-09-27 | End: 2018-09-29 | Stop reason: HOSPADM

## 2018-09-27 RX ORDER — SODIUM CHLORIDE 0.9 % (FLUSH) 0.9 %
3 SYRINGE (ML) INJECTION EVERY 8 HOURS
Status: DISCONTINUED | OUTPATIENT
Start: 2018-09-27 | End: 2018-09-29 | Stop reason: HOSPADM

## 2018-09-27 RX ORDER — GABAPENTIN 300 MG/1
300 CAPSULE ORAL NIGHTLY
Status: DISCONTINUED | OUTPATIENT
Start: 2018-09-27 | End: 2018-09-29 | Stop reason: HOSPADM

## 2018-09-27 RX ORDER — NEOSTIGMINE METHYLSULFATE 1 MG/ML
INJECTION, SOLUTION INTRAVENOUS
Status: DISCONTINUED | OUTPATIENT
Start: 2018-09-27 | End: 2018-09-27

## 2018-09-27 RX ORDER — OXYCODONE HYDROCHLORIDE 5 MG/1
5 TABLET ORAL EVERY 4 HOURS PRN
Status: DISCONTINUED | OUTPATIENT
Start: 2018-09-27 | End: 2018-09-29 | Stop reason: HOSPADM

## 2018-09-27 RX ORDER — ONDANSETRON 2 MG/ML
4 INJECTION INTRAMUSCULAR; INTRAVENOUS DAILY PRN
Status: DISCONTINUED | OUTPATIENT
Start: 2018-09-27 | End: 2018-09-27 | Stop reason: HOSPADM

## 2018-09-27 RX ORDER — ROCURONIUM BROMIDE 10 MG/ML
INJECTION, SOLUTION INTRAVENOUS
Status: DISCONTINUED | OUTPATIENT
Start: 2018-09-27 | End: 2018-09-27

## 2018-09-27 RX ORDER — SODIUM CHLORIDE 0.9 % (FLUSH) 0.9 %
3 SYRINGE (ML) INJECTION
Status: DISCONTINUED | OUTPATIENT
Start: 2018-09-27 | End: 2018-09-29 | Stop reason: HOSPADM

## 2018-09-27 RX ORDER — ACETAMINOPHEN 10 MG/ML
INJECTION, SOLUTION INTRAVENOUS
Status: DISCONTINUED | OUTPATIENT
Start: 2018-09-27 | End: 2018-09-27

## 2018-09-27 RX ADMIN — MIDAZOLAM HYDROCHLORIDE 5 MG: 5 INJECTION, SOLUTION INTRAMUSCULAR; INTRAVENOUS at 08:09

## 2018-09-27 RX ADMIN — PROPOFOL 120 MG: 10 INJECTION, EMULSION INTRAVENOUS at 10:09

## 2018-09-27 RX ADMIN — FENTANYL CITRATE 25 MCG: 50 INJECTION INTRAMUSCULAR; INTRAVENOUS at 01:09

## 2018-09-27 RX ADMIN — HYDROMORPHONE HYDROCHLORIDE 0.4 MG: 2 INJECTION INTRAMUSCULAR; INTRAVENOUS; SUBCUTANEOUS at 02:09

## 2018-09-27 RX ADMIN — OXYCODONE HYDROCHLORIDE 5 MG: 5 TABLET ORAL at 05:09

## 2018-09-27 RX ADMIN — PHENYLEPHRINE HYDROCHLORIDE 100 MCG: 10 INJECTION INTRAVENOUS at 10:09

## 2018-09-27 RX ADMIN — RAMELTEON 8 MG: 8 TABLET, FILM COATED ORAL at 10:09

## 2018-09-27 RX ADMIN — EPHEDRINE SULFATE 20 MG: 50 INJECTION INTRAMUSCULAR; INTRAVENOUS; SUBCUTANEOUS at 11:09

## 2018-09-27 RX ADMIN — OXYCODONE HYDROCHLORIDE 10 MG: 5 TABLET ORAL at 10:09

## 2018-09-27 RX ADMIN — Medication 3 ML: at 10:09

## 2018-09-27 RX ADMIN — FAMOTIDINE 20 MG: 20 TABLET ORAL at 08:09

## 2018-09-27 RX ADMIN — SODIUM CHLORIDE, SODIUM LACTATE, POTASSIUM CHLORIDE, AND CALCIUM CHLORIDE: 600; 310; 30; 20 INJECTION, SOLUTION INTRAVENOUS at 09:09

## 2018-09-27 RX ADMIN — CEFAZOLIN SODIUM 2 G: 2 SOLUTION INTRAVENOUS at 10:09

## 2018-09-27 RX ADMIN — FENTANYL CITRATE 100 MCG: 50 INJECTION, SOLUTION INTRAMUSCULAR; INTRAVENOUS at 10:09

## 2018-09-27 RX ADMIN — ROCURONIUM BROMIDE 30 MG: 10 INJECTION, SOLUTION INTRAVENOUS at 10:09

## 2018-09-27 RX ADMIN — GLYCOPYRROLATE 0.6 MG: 0.2 INJECTION, SOLUTION INTRAMUSCULAR; INTRAVENOUS at 12:09

## 2018-09-27 RX ADMIN — FENTANYL CITRATE 50 MCG: 50 INJECTION, SOLUTION INTRAMUSCULAR; INTRAVENOUS at 01:09

## 2018-09-27 RX ADMIN — NEOSTIGMINE METHYLSULFATE 5 MG: 1 INJECTION INTRAVENOUS at 12:09

## 2018-09-27 RX ADMIN — EPHEDRINE SULFATE 10 MG: 50 INJECTION INTRAMUSCULAR; INTRAVENOUS; SUBCUTANEOUS at 12:09

## 2018-09-27 RX ADMIN — GABAPENTIN 300 MG: 300 CAPSULE ORAL at 08:09

## 2018-09-27 RX ADMIN — ACETAMINOPHEN 1000 MG: 10 INJECTION, SOLUTION INTRAVENOUS at 12:09

## 2018-09-27 RX ADMIN — LIDOCAINE HYDROCHLORIDE 75 MG: 20 INJECTION, SOLUTION INTRAVENOUS at 10:09

## 2018-09-27 RX ADMIN — CARBOXYMETHYLCELLULOSE SODIUM 2 DROP: 2.5 SOLUTION/ DROPS OPHTHALMIC at 10:09

## 2018-09-27 RX ADMIN — FENTANYL CITRATE 50 MCG: 50 INJECTION, SOLUTION INTRAMUSCULAR; INTRAVENOUS at 11:09

## 2018-09-27 NOTE — INTERVAL H&P NOTE
The patient has been examined and the H&P has been reviewed:    I concur with the findings and no changes have occurred since H&P was written.              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

## 2018-09-27 NOTE — ANESTHESIA POSTPROCEDURE EVALUATION
Anesthesia Post Evaluation    Patient: Ramakrishna Latham     Procedure(s) Performed: Procedure(s) (LRB):  THYROIDECTOMY - NECK EXPLORATION LEFT THYROIDECTOMY - FROZEN SECTION (Left)  BIOPSY, LYMPH NODE (Right)    Final Anesthesia Type: general  Patient location during evaluation: PACU  Patient participation: Yes- Able to Participate  Level of consciousness: awake and alert  Post-procedure vital signs: reviewed and stable  Pain management: adequate  Airway patency: patent  PONV status at discharge: No PONV  Anesthetic complications: no      Cardiovascular status: hemodynamically stable  Respiratory status: unassisted  Hydration status: euvolemic  Follow-up not needed.        Visit Vitals  /63   Pulse 95   Temp 36.7 °C (98 °F) (Oral)   Resp 16   Ht 6' (1.829 m)   Wt 77.6 kg (171 lb)   SpO2 96%   BMI 23.19 kg/m²       Pain/Christy Score: Pain Assessment Performed: Yes (9/27/2018  1:45 PM)  Presence of Pain: denies (9/27/2018  1:45 PM)  Pain Rating Prior to Med Admin: 4 (9/27/2018  2:00 PM)  Christy Score: 9 (9/27/2018  1:45 PM)

## 2018-09-27 NOTE — PLAN OF CARE
Problem: Patient Care Overview  Goal: Plan of Care Review  Outcome: Ongoing (interventions implemented as appropriate)  Pt on 3L NC. Sats 96%. No distress noted. Will continue to monitor.

## 2018-09-27 NOTE — TRANSFER OF CARE
Anesthesia Transfer of Care Note    Patient: Ramakrishna Latham JrRashawn    Procedure(s) Performed: Procedure(s) (LRB):  THYROIDECTOMY - NECK EXPLORATION LEFT THYROIDECTOMY - FROZEN SECTION (Left)  BIOPSY, LYMPH NODE (Right)    Patient location: PACU    Anesthesia Type: general    Transport from OR: Transported from OR on room air with adequate spontaneous ventilation    Post pain: adequate analgesia    Post assessment: no apparent anesthetic complications    Post vital signs: stable    Level of consciousness: awake    Nausea/Vomiting: no nausea/vomiting    Complications: none    Transfer of care protocol was followed      Last vitals:   Visit Vitals  BP (!) 158/74   Pulse 96   Temp 36.6 °C (97.8 °F) (Oral)   Resp 16   Ht 6' (1.829 m)   Wt 77.6 kg (171 lb)   SpO2 98%   BMI 23.19 kg/m²

## 2018-09-27 NOTE — OP NOTE
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 Central dissection, biopsy Rt submandibular lymph node, LT jugular node dissection.    Description of the findings of the procedure: 0.8 cm papillary ca Lt lobe, nodes negative clinically and frozen    Estimated Blood loss; , 50 cc         Specimens:   Specimen (12h ago, onward)    Start     Ordered    09/27/18 1248  Specimen to Pathology - Surgery  Once     Comments:  4, LEFT TUBULAR NODES DISSECTION     Start Status   09/27/18 1248 Collected (09/27/18 1258)       09/27/18 1257    09/27/18 1218  Specimen to Pathology - Surgery  Once     Comments:  TISSUE FROM CENTRAL DISSECTION     Start Status   09/27/18 1218 Collected (09/27/18 1224)       09/27/18 1218    09/27/18 1209  Specimen to Pathology - Surgery  Once     Comments:  2, RIGHT SUBMAXILLARY LYMPH NODE (fROZEN SECTION)     Start Status   09/27/18 1209 Collected (09/27/18 1210)       09/27/18 1210    09/27/18 1146  Specimen to Pathology - Surgery  Once     Comments:  1. THYROID LEFT LOBE (FROZEN)     Start Status   09/27/18 1146 Collected (09/27/18 1145)       09/27/18 1145

## 2018-09-28 LAB — BLOOD GROUP ANTIBODIES SERPL: NORMAL

## 2018-09-28 PROCEDURE — 94761 N-INVAS EAR/PLS OXIMETRY MLT: CPT

## 2018-09-28 PROCEDURE — A4216 STERILE WATER/SALINE, 10 ML: HCPCS | Performed by: SPECIALIST

## 2018-09-28 PROCEDURE — 25000003 PHARM REV CODE 250: Performed by: SPECIALIST

## 2018-09-28 PROCEDURE — 27000221 HC OXYGEN, UP TO 24 HOURS

## 2018-09-28 RX ADMIN — Medication 3 ML: at 02:09

## 2018-09-28 RX ADMIN — OXYCODONE HYDROCHLORIDE 5 MG: 5 TABLET ORAL at 09:09

## 2018-09-28 RX ADMIN — Medication 3 ML: at 06:09

## 2018-09-28 RX ADMIN — OXYCODONE HYDROCHLORIDE 10 MG: 5 TABLET ORAL at 06:09

## 2018-09-28 RX ADMIN — Medication 3 ML: at 09:09

## 2018-09-28 RX ADMIN — GABAPENTIN 300 MG: 300 CAPSULE ORAL at 09:09

## 2018-09-28 RX ADMIN — OXYCODONE HYDROCHLORIDE 10 MG: 5 TABLET ORAL at 04:09

## 2018-09-28 NOTE — PLAN OF CARE
Problem: Patient Care Overview  Goal: Plan of Care Review  Outcome: Ongoing (interventions implemented as appropriate)  Pt remains on 2LNC.No resp distress noted.

## 2018-09-28 NOTE — OP NOTE
DATE OF PROCEDURE:  09/27/2018    PREOPERATIVE DIAGNOSIS:  Possible carcinoma of the thyroid lesion, left lobe of   the thyroid.    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  No   gross invasion of the capsule.  Preoperative lymphadenopathy of the neck,   submandibular area.  Clinically and pathologically benign on initial frozen   section.    PROCEDURE:  Neck exploration, left thyroid lobectomy and isthmusectomy with   central neck dissection and lymphadenectomy, left jugular lymph node dissection   and excision of right submandibular node through a separate incision.    PROCEDURE IN DETAIL:  The patient was brought to the Operating Room, placed in   supine position.  Neck and chest prepped and draped in a sterile fashion.  A   transverse incision made 2 cm above the sternal notch, carried down through the   subcutaneous tissue and platysma.  Flaps developed superiorly to the thyroid   cartilage and inferiorly to the clavicles.  The strap muscles opened in the   midline.  The left lobe of the thyroid then dissected free from surrounding   structures using blunt dissection.  The middle thyroid vessels ligated with 3-0   Vicryl as was the inferior thyroid vessels on the left.  The inferior   parathyroid was seen in its most common anatomic location inferior to the   thyroid nestled in the inferior thyroid of vessels.  This was preserved and   dissected free.  The recurrent laryngeal nerve was identified in the   tracheoesophageal groove and preserved along its course.  The dissection then   carried superiorly where the thyroid was freed from surrounding structures.  The   superior parathyroid on the left identified posterior to the upper aspect of   the thyroid.  It was also preserved.  The superior thyroid vessel then ligated   doubly with 3-0 Vicryl and Hemoclips.  The thyroid rotated medially and then   transected at the junction of the isthmus and the right lobe using the    electrocautery Bovie.  The specimen then sent for frozen section.  The   pathologist returned a diagnosis of a calcified 8 mm papillary carcinoma.    Preoperatively, imaging studies suggested a level 2 nodes in the neck.  The left   side of the neck was then carefully explored.  The carotid sheath opened and   the lymph node dissection performed along the internal jugular vein.  There were   multiple small, but nonpathologic appearing nodes, which were retrieved.    Hemostasis obtained with Vicryl ties.  A palpable node was present in the right   submandibular area; however, this could not be reached through the lower   transverse incision; therefore, a transverse incision was made up in the upper   aspect of the neck over this lesion, which was then carried down through the   platysma.  The node identified and isolated, dissected free from surrounding   structures.  Hemostasis obtained with 3-0 Vicryl ties.  The node, clinically   appeared to be enlarged, approximately 1.5 cm in size.  It was sent for frozen   section and appeared to be benign.  The skin incision in the right neck was then   closed with interrupted 3-0 Vicryl and running 4-0 Monocryl.  The thyroid bed   was then carefully inspected after hemostasis had been obtained.  It was   irrigated, a 15 Edis drain placed in the neck below the strap muscles.  The   strap muscles then brought through a separate stab wound in the neck.  The strap   muscles closed with interrupted 3-0 Vicryl, the platysma with interrupted 3-0   Vicryl.  The upper aspect of the mediastinum and the central node area was   carefully inspected and soft tissues removed, there appeared to be no gross   adenopathy.  The patient tolerated the procedure well and left the Operating   Room in good condition.      ROBERT  dd: 09/27/2018 20:16:34 (CDT)  td: 09/27/2018 21:40:18 (CD)  Doc ID   #[id number]  Job ID #[id number]    CC:

## 2018-09-28 NOTE — PLAN OF CARE
Problem: Patient Care Overview  Goal: Plan of Care Review  Outcome: Ongoing (interventions implemented as appropriate)  Patient is alert and oriented x 3. He is post op day 1 surgical incision dressing is clean dry intact, one HITESH drain to site draining bloody fluid.Cold pack place intermittently to surgical site.  Intermittent pain to neck, controlled with oral pain medication. Morales in place, draining clear yellow urine. Preferred soft foods for dinner, because his throat hurt. IV fluids maintained. Purposeful hourly rounding conducted and will continue to monitor.

## 2018-09-29 VITALS
DIASTOLIC BLOOD PRESSURE: 65 MMHG | WEIGHT: 171 LBS | OXYGEN SATURATION: 94 % | HEIGHT: 72 IN | SYSTOLIC BLOOD PRESSURE: 135 MMHG | TEMPERATURE: 98 F | RESPIRATION RATE: 18 BRPM | HEART RATE: 75 BPM | BODY MASS INDEX: 23.16 KG/M2

## 2018-09-29 LAB
ALBUMIN SERPL BCP-MCNC: 3.6 G/DL
ALP SERPL-CCNC: 73 U/L
ALT SERPL W/O P-5'-P-CCNC: 18 U/L
ANION GAP SERPL CALC-SCNC: 6 MMOL/L
AST SERPL-CCNC: 17 U/L
BILIRUB SERPL-MCNC: 0.3 MG/DL
BUN SERPL-MCNC: 7 MG/DL
CALCIUM SERPL-MCNC: 8.6 MG/DL
CHLORIDE SERPL-SCNC: 105 MMOL/L
CO2 SERPL-SCNC: 29 MMOL/L
CREAT SERPL-MCNC: 0.7 MG/DL
EST. GFR  (AFRICAN AMERICAN): >60 ML/MIN/1.73 M^2
EST. GFR  (NON AFRICAN AMERICAN): >60 ML/MIN/1.73 M^2
GLUCOSE SERPL-MCNC: 102 MG/DL
POTASSIUM SERPL-SCNC: 3.8 MMOL/L
PROT SERPL-MCNC: 6.7 G/DL
SODIUM SERPL-SCNC: 140 MMOL/L

## 2018-09-29 PROCEDURE — 25000003 PHARM REV CODE 250: Performed by: SPECIALIST

## 2018-09-29 PROCEDURE — 80053 COMPREHEN METABOLIC PANEL: CPT

## 2018-09-29 PROCEDURE — 94761 N-INVAS EAR/PLS OXIMETRY MLT: CPT

## 2018-09-29 PROCEDURE — A4216 STERILE WATER/SALINE, 10 ML: HCPCS | Performed by: SPECIALIST

## 2018-09-29 PROCEDURE — 36415 COLL VENOUS BLD VENIPUNCTURE: CPT

## 2018-09-29 RX ORDER — OXYCODONE AND ACETAMINOPHEN 7.5; 325 MG/1; MG/1
1 TABLET ORAL EVERY 4 HOURS PRN
Qty: 40 TABLET | Refills: 0 | Status: SHIPPED | OUTPATIENT
Start: 2018-09-29

## 2018-09-29 RX ADMIN — OXYCODONE HYDROCHLORIDE 10 MG: 5 TABLET ORAL at 02:09

## 2018-09-29 RX ADMIN — SODIUM CHLORIDE, PRESERVATIVE FREE 3 ML: 5 INJECTION INTRAVENOUS at 02:09

## 2018-09-29 RX ADMIN — OXYCODONE HYDROCHLORIDE 5 MG: 5 TABLET ORAL at 02:09

## 2018-09-29 RX ADMIN — BENAZEPRIL HYDROCHLORIDE 20 MG: 10 TABLET, FILM COATED ORAL at 05:09

## 2018-09-29 RX ADMIN — AMLODIPINE BESYLATE 5 MG: 5 TABLET ORAL at 05:09

## 2018-09-29 RX ADMIN — OXYCODONE HYDROCHLORIDE 10 MG: 5 TABLET ORAL at 04:09

## 2018-09-29 NOTE — PLAN OF CARE
Problem: Patient Care Overview  Goal: Plan of Care Review  Outcome: Ongoing (interventions implemented as appropriate)  AAOx4.  NAD noted.  Pt remained free from injury or falls.  Pt remains free from skin breakdowns. Vitals signs stable throughout shift on RA.  Positions self independently.  Voiding adequately throughout shift.  Pt had no complaints of pain.  Pt remained afebrile this shift.  Bilateral neck incisions CDI. Encouraged IS usage.  Taught proper technique for IS usage.  Pt return demonstrated.  Purposeful rounding done.  All needs met.  Will continue to monitor.

## 2018-09-29 NOTE — PLAN OF CARE
11:07 AM   Eager for DC home- VSS & afebrile   Tolerating diet advancement to Regular  Pain Controlled on PO / Ambulating in Hallway  Weaned to RA sats remain % / TCDB & IS done properly and independently

## 2018-09-29 NOTE — NURSING
2:12 PM   In agreement and eager for DC.  VU of DC instructions, paperwork and prescription passed. IV removed with cath tip intact, WNL.  To be DC home with family. Drain measuring cup and log sheet provided, VIDAL springer RD Will be escorted downstairs via  transport team once dressed and ready.   Free from falls or skin breakdown this hospital admission.

## 2018-09-29 NOTE — DISCHARGE INSTRUCTIONS
"  Caring for a Closed Suction Drainage Tube  A drainage tube removes fluid from around an incision. This helps prevent infection and promotes healing. The collection bulb at the end of the tube is squeezed and plugged to create suction. The bulb should be emptied and reset when half full to maintain adequate suction. You need to empty the bulb and clean the skin around the drain as often as your healthcare provider tells you to. Follow the steps below.     What youll need  Have the following items ready:  · Disposable gloves  · Measuring cup  · Record sheet  · Gauze or paper towel  · Sterile cotton swabs or 4" x 4" gauze pads  · Sterile saline or soap and water       Step 1. Empty the bulb  · Wash your hands and put on a new pair of disposable gloves.  · Point the top of the bulb away from you and remove the stopper.  · Turn the bulb upside down over a measuring cup. Squeeze the fluid into the cup. Make sure the bulb is totally empty.  · Put the cup to one side. You can record the volume of liquid in the cup after you clean and reconnect the bulb in step 2.    Step 2. Clean and reconnect the bulb  · Clean the top of the bulb with clean gauze or a paper towel, if needed.  · Squeeze the bulb tight, and put the stopper back on the top.  · Record the amount of fluid in the cup. Then, empty the cup as directed.    Step 3. Clean the site  · Remove your disposable gloves and wash your hands before cleaning the site.  · Put on a new pair of disposable gloves.  · Wet a sterile cotton swab or 4" x 4" gauze pad with sterile saline or soap and water.  · Gently clean the skin around the drain. Always wipe away from the incision.  · Apply an antibacterial ointment if directed.   When to call your healthcare provider  Call your healthcare provider if you notice any of these changes:  · The amount of fluid increases or decreases suddenly  · Large amount of blood or a clot in drainage  · Color, odor, or thickness of the fluid " "changes  · Tube falls out or the incision opens  · Skin around the drain is red, swollen, painful, or seeping pus  · You have a fever of 100.4°F (38°C) or higher, or as directed by your healthcare provider     If the tube isn't draining  Here are tips to drain the tube:  · Uncurl any kinks in the tube.  · With one hand, firmly hold the base of the tube between your thumb and index finger. Do not touch the incision.  · Put the thumb and index finger of your other hand on the tube, next to the first hand. Pinch your fingers together. Then pull them along the tube toward the bag. This will help push any clogged fluid through the tube. This is called "stripping the tube." You may find it helpful to hold an alcohol swab between your fingers and the tube to lubricate the tubing.  · If the tube still does not drain, call your healthcare provider.   Date Last Reviewed: 12/1/2016  © 7692-3856 [employer]. [address] [place] [address], [place] [address]. All rights reserved. This information is not intended as a substitute for professional medical care. Always follow your healthcare professional's instructions.        "

## 2018-09-29 NOTE — NURSING
Called Dr Hartman regarding the pt's elevated BP.  Informed MD that the pt did not receive his am dose of HTN medication on 9/28 due to his low BP.  MD verbalized understanding and stated to administer the am dose of the HTN medication. Medication administered per MD.

## 2018-09-30 NOTE — DISCHARGE SUMMARY
DATE OF ADMISSION:  09/27/2018    DATE OF DISCHARGE:  09/29/2018.    HOSPITAL COURSE AND BRIEF CLINICAL HISTORY:  The patient is a 73-year-old male   with a history of a suspicious lesion in the left lobe of the thyroid.  In   addition, the patient had a suspicious lesion in the lung.  Both of these   lesions had been worked up and evaluated by his primary care physician as well   as his hematologist/oncologist.  Dr. Basurto and cardiothoracic surgeon, Dr. Finley.    After mutual discussions, it was decided to proceed with thyroidectomy.  The   neck was explored and the patient found to have an 8 mm papillary carcinoma   without extension beyond the thyroid gland.  In addition, frozen sections were   obtained of a lymph node of the right submandibular area, which had been   enlarged on imaging studies and was negative on frozen section.  The patient had   some level 2 lymph nodes.  A left internal jugular dissection was done.  These   all appeared on initial evaluation to be negative.  The patient is being   discharged to be followed by me in my office as well as his other physicians.    He has been instructed as the appropriate limitations of activity and diet as   well as wound care.  Pending the outcome of the permanent sections of the   thyroid and lymph node tissue and further disposition will be made.      ROBERT  dd: 09/29/2018 13:18:30 (CDT)  td: 09/30/2018 10:14:54 (CDT)  Doc ID   #[id number]  Job ID #[id number]    CC:

## 2022-08-08 ENCOUNTER — IMMUNIZATION (OUTPATIENT)
Dept: INTERNAL MEDICINE | Facility: CLINIC | Age: 77
End: 2022-08-08
Payer: MEDICARE

## 2022-08-08 DIAGNOSIS — Z23 NEED FOR VACCINATION: Primary | ICD-10-CM

## 2022-08-08 PROCEDURE — 0054A COVID-19, MRNA, LNP-S, PF, 30 MCG/0.3 ML DOSE VACCINE (PFIZER): CPT | Mod: CV19,PBBFAC | Performed by: INTERNAL MEDICINE

## 2022-12-08 ENCOUNTER — IMMUNIZATION (OUTPATIENT)
Dept: INTERNAL MEDICINE | Facility: CLINIC | Age: 77
End: 2022-12-08
Payer: MEDICARE

## 2022-12-08 DIAGNOSIS — Z23 NEED FOR VACCINATION: Primary | ICD-10-CM

## 2022-12-08 PROCEDURE — 0124A COVID-19, MRNA, LNP-S, BIVALENT BOOSTER, PF, 30 MCG/0.3 ML DOSE: CPT | Mod: CV19,PBBFAC | Performed by: INTERNAL MEDICINE

## 2022-12-08 PROCEDURE — 91312 COVID-19, MRNA, LNP-S, BIVALENT BOOSTER, PF, 30 MCG/0.3 ML DOSE: ICD-10-PCS | Mod: S$GLB,,, | Performed by: INTERNAL MEDICINE

## 2022-12-08 PROCEDURE — 91312 COVID-19, MRNA, LNP-S, BIVALENT BOOSTER, PF, 30 MCG/0.3 ML DOSE: CPT | Mod: S$GLB,,, | Performed by: INTERNAL MEDICINE

## 2022-12-19 ENCOUNTER — PATIENT OUTREACH (OUTPATIENT)
Dept: ADMINISTRATIVE | Facility: HOSPITAL | Age: 77
End: 2022-12-19
Payer: MEDICARE

## 2022-12-19 NOTE — PROGRESS NOTES
Patient due for the following    Hepatitis C Screening     Lipid Panel     TETANUS VACCINE     Shingles Vaccine (1 of 2)    Pneumococcal Vaccines (Age 65+) (2 - PCV)    Influenza Vaccine (1)      Immunizations: reviewed and updated  Care Everywhere: triggered   Care Teams: up to date  Outreach: none needed

## 2023-08-02 ENCOUNTER — IMMUNIZATION (OUTPATIENT)
Dept: INTERNAL MEDICINE | Facility: CLINIC | Age: 78
End: 2023-08-02
Payer: MEDICARE

## 2023-08-02 DIAGNOSIS — Z23 NEED FOR VACCINATION: Primary | ICD-10-CM

## 2023-08-02 PROCEDURE — 91312 COVID-19, MRNA, LNP-S, BIVALENT BOOSTER, PF, 30 MCG/0.3 ML DOSE: ICD-10-PCS | Mod: S$GLB,,, | Performed by: INTERNAL MEDICINE

## 2023-08-02 PROCEDURE — 0124A COVID-19, MRNA, LNP-S, BIVALENT BOOSTER, PF, 30 MCG/0.3 ML DOSE: CPT | Mod: S$GLB,,, | Performed by: INTERNAL MEDICINE

## 2023-08-02 PROCEDURE — 0124A COVID-19, MRNA, LNP-S, BIVALENT BOOSTER, PF, 30 MCG/0.3 ML DOSE: ICD-10-PCS | Mod: S$GLB,,, | Performed by: INTERNAL MEDICINE

## 2023-08-02 PROCEDURE — 91312 COVID-19, MRNA, LNP-S, BIVALENT BOOSTER, PF, 30 MCG/0.3 ML DOSE: CPT | Mod: S$GLB,,, | Performed by: INTERNAL MEDICINE

## 2024-07-17 ENCOUNTER — OFFICE VISIT (OUTPATIENT)
Dept: URGENT CARE | Facility: CLINIC | Age: 79
End: 2024-07-17
Payer: OTHER GOVERNMENT

## 2024-07-17 VITALS
HEIGHT: 72 IN | WEIGHT: 170 LBS | BODY MASS INDEX: 23.03 KG/M2 | TEMPERATURE: 98 F | HEART RATE: 97 BPM | SYSTOLIC BLOOD PRESSURE: 172 MMHG | OXYGEN SATURATION: 98 % | RESPIRATION RATE: 20 BRPM | DIASTOLIC BLOOD PRESSURE: 91 MMHG

## 2024-07-17 DIAGNOSIS — M25.562 ACUTE PAIN OF LEFT KNEE: Primary | ICD-10-CM

## 2024-07-17 DIAGNOSIS — S86.912A KNEE STRAIN, LEFT, INITIAL ENCOUNTER: ICD-10-CM

## 2024-07-17 PROCEDURE — 99203 OFFICE O/P NEW LOW 30 MIN: CPT | Mod: S$GLB,,, | Performed by: NURSE PRACTITIONER

## 2024-07-17 PROCEDURE — 73562 X-RAY EXAM OF KNEE 3: CPT | Mod: LT,S$GLB,, | Performed by: RADIOLOGY

## 2024-07-17 RX ORDER — ACETAMINOPHEN 500 MG
1000 TABLET ORAL
Status: COMPLETED | OUTPATIENT
Start: 2024-07-17 | End: 2024-07-17

## 2024-07-17 RX ORDER — METHOCARBAMOL 500 MG/1
500 TABLET, FILM COATED ORAL 3 TIMES DAILY
Qty: 30 TABLET | Refills: 0 | Status: SHIPPED | OUTPATIENT
Start: 2024-07-17 | End: 2024-07-27

## 2024-07-17 RX ADMIN — Medication 1000 MG: at 03:07

## 2024-07-17 NOTE — PROGRESS NOTES
Subjective:      Patient ID: Ramakrishna Latham Jr. is a 79 y.o. male.    Vitals:  height is 6' (1.829 m) and weight is 77.1 kg (169 lb 15.6 oz). His oral temperature is 98.3 °F (36.8 °C). His blood pressure is 172/91 (abnormal) and his pulse is 97. His respiration is 20 and oxygen saturation is 98%.     Chief Complaint: Knee Pain    79 year old male c/o L knee pain.  Pt states his L knee was hurting since last Thursday. Pt states that he didn't do anything and doesn't know where the pain came from. Pt states aching and burning pain. Pt denies tingling or pain radiating anywhere else. Pt states the pain is constant thumping, but more so when walking or moving. Pt states using cold hot patches. Pt reports not over doing physical activity.      Knee Pain   The incident occurred more than 1 week ago. The incident occurred at home. There was no injury mechanism. The pain is present in the left knee. The quality of the pain is described as burning and aching. The pain is at a severity of 8/10. The pain is severe. The pain has been Constant since onset. Pertinent negatives include no tingling. The symptoms are aggravated by movement and weight bearing. Treatments tried: cold hot patches. The treatment provided mild relief.     Neck: Negative for neck pain and neck stiffness.   Cardiovascular:  Negative for chest pain, leg swelling, palpitations and sob on exertion.   Musculoskeletal:  Positive for joint pain (Left knee). Negative for joint swelling, abnormal ROM of joint, pain with walking and muscle ache.   Skin:  Negative for rash.      Objective:     Physical Exam   Constitutional: He is oriented to person, place, and time. He appears well-developed. He is cooperative. normalawake  HENT:   Head: Normocephalic and atraumatic.   Ears:   Right Ear: Hearing, tympanic membrane, external ear and ear canal normal.   Left Ear: Hearing, tympanic membrane, external ear and ear canal normal.   Nose: Nose normal. No congestion.    Mouth/Throat: Uvula is midline, oropharynx is clear and moist and mucous membranes are normal. Mucous membranes are moist. No posterior oropharyngeal erythema.   Eyes: Conjunctivae, EOM and lids are normal. Pupils are equal, round, and reactive to light.   Neck: Trachea normal and phonation normal. Neck supple. No thyromegaly present.   Cardiovascular: Normal rate, regular rhythm, S1 normal, S2 normal, normal heart sounds and normal pulses.   Pulmonary/Chest: Effort normal and breath sounds normal. No stridor. He has no wheezes.   Abdominal: Normal appearance and bowel sounds are normal. Soft. flat abdomen   Musculoskeletal: Normal range of motion.         General: Normal range of motion.      Right knee: Normal.      Left knee: He exhibits normal range of motion, no swelling and no erythema. Tenderness found. Medial joint line tenderness noted. No lateral joint line tenderness noted.      Right lower leg: No edema.      Left lower leg: No edema.      Comments: Left medial knee soreness, +2  pedal, posterior tibial and popliteal pulse   Lymphadenopathy:     He has no cervical adenopathy.   Neurological: no focal deficit. He is alert and oriented to person, place, and time.   Skin: Skin is warm, dry and intact. Capillary refill takes less than 2 seconds.   Psychiatric: His speech is normal and behavior is normal. Mood, judgment and thought content normal.   Nursing note and vitals reviewed.    XR KNEE 3 VIEW LEFT    Result Date: 7/17/2024  EXAMINATION: XR KNEE 3 VIEW LEFT CLINICAL HISTORY: Pain in left knee TECHNIQUE: AP, lateral, and Merchant views of the left knee were performed. COMPARISON: None FINDINGS: Three views left knee. There is medial compartmental narrowing.  There are degenerative changes of the knee.  No large knee joint effusion.  There is vascular calcification.     1. No acute displaced fracture or dislocation of the left knee. Electronically signed by: Fabrizio Rios MD Date:    07/17/2024  Time:    15:25     Assessment:     1. Acute pain of left knee    2. Knee strain, left, initial encounter      Discussed with pt xray of left knee findings, Negative for fracture or dislocation, positive for knee degenerative changes. Knee wrapped with 4 inch ace,  Discussed with pt RICE at home and follow up with Orthopedic  Plan:       Acute pain of left knee  -     XR KNEE 3 VIEW LEFT; Future; Expected date: 07/17/2024  -     acetaminophen tablet 1,000 mg  -     Ambulatory referral/consult to Orthopedics    Knee strain, left, initial encounter  -     acetaminophen tablet 1,000 mg  -     Ambulatory referral/consult to Orthopedics  -     methocarbamoL (ROBAXIN) 500 MG Tab; Take 1 tablet (500 mg total) by mouth 3 (three) times daily. for 10 days  Dispense: 30 tablet; Refill: 0             Patient Instructions   Discharge instructions Left knee strain    A strain is an injury to muscles or tendons. Immediate treatment of sprains or strains includes RICE - Rest, ice, compression and elevation to the affected joint or limb as needed.    Rest. Allow your injury to heal before you do slow movements.  Place an ice pack or a bag of frozen peas wrapped in a towel over the painful part. Never put ice right on the skin. Do not leave the ice on more than 10 to 15 minutes at a time. Ice after activity may help decrease pain and swelling. Never ice before stretching.  Prop your wrist/arm/knee/pain on pillows to help with swelling.  Use a splint/ brace if the doctor tells you to do this.  Please drink plenty of fluids.  Please get plenty of rest.      Pain Control  If not allergic, please take over the counter Tylenol (Acetaminophen) 650 mg every 4-6 hours and/or Motrin (Ibuprofen) 400 mg every 4-6 hours as directed for control of pain and/or fever.    If you were prescribed a narcotic medication, do not drive or operate heavy equipment or machinery while taking these medications.    If you were not prescribed an anti-inflammatory  medication, and if you do not have any history of stomach/intestinal ulcers, or kidney disease, or are not taking a blood thinner such as Coumadin, Plavix, Pradaxa, Eloquis, or Xaralta for example, it is OK to take over the counter Ibuprofen or Advil or Motrin or Aleve as directed.  Do not take these medications on an empty stomach.    Please remember that you have received care at an urgent care today. Urgent cares are not emergency rooms and are not equipped to handle life threatening emergencies and cannot rule in or out certain medical conditions and you may be released before all of your medical problems are known or treated. Please arrange follow up with your primary care physician or speciality clinic   (orthopedics) within 2-5 days if your signs and symptoms have not resolved or worsen. Patient can call our Referral Hotline at [phone] to make an appointment.    Please return here or go to the Emergency Department for any concerns or worsening of condition.Patient was educated on signs/symptoms that would warrant emergent medical attention. Patient verbalized understanding.  More trouble getting up from a chair, going up and down stairs, or walking  Pain, swelling, warmth, numbness, tingling, or discoloration in the calf below the injured or sore knee  You are not feeling better in 2 or 3 days or you are feeling worse

## 2024-07-17 NOTE — PATIENT INSTRUCTIONS
Discharge instructions Left knee strain    A strain is an injury to muscles or tendons. Immediate treatment of sprains or strains includes RICE - Rest, ice, compression and elevation to the affected joint or limb as needed.    Rest. Allow your injury to heal before you do slow movements.  Place an ice pack or a bag of frozen peas wrapped in a towel over the painful part. Never put ice right on the skin. Do not leave the ice on more than 10 to 15 minutes at a time. Ice after activity may help decrease pain and swelling. Never ice before stretching.  Prop your wrist/arm/knee/pain on pillows to help with swelling.  Use a splint/ brace if the doctor tells you to do this.  Please drink plenty of fluids.  Please get plenty of rest.      Pain Control  If not allergic, please take over the counter Tylenol (Acetaminophen) 650 mg every 4-6 hours and/or Motrin (Ibuprofen) 400 mg every 4-6 hours as directed for control of pain and/or fever.    If you were prescribed a narcotic medication, do not drive or operate heavy equipment or machinery while taking these medications.    If you were not prescribed an anti-inflammatory medication, and if you do not have any history of stomach/intestinal ulcers, or kidney disease, or are not taking a blood thinner such as Coumadin, Plavix, Pradaxa, Eloquis, or Xaralta for example, it is OK to take over the counter Ibuprofen or Advil or Motrin or Aleve as directed.  Do not take these medications on an empty stomach.    Please remember that you have received care at an urgent care today. Urgent cares are not emergency rooms and are not equipped to handle life threatening emergencies and cannot rule in or out certain medical conditions and you may be released before all of your medical problems are known or treated. Please arrange follow up with your primary care physician or speciality clinic   (orthopedics) within 2-5 days if your signs and symptoms have not resolved or worsen. Patient can  call our Referral Hotline at [phone] to make an appointment.    Please return here or go to the Emergency Department for any concerns or worsening of condition.Patient was educated on signs/symptoms that would warrant emergent medical attention. Patient verbalized understanding.  More trouble getting up from a chair, going up and down stairs, or walking  Pain, swelling, warmth, numbness, tingling, or discoloration in the calf below the injured or sore knee  You are not feeling better in 2 or 3 days or you are feeling worse

## (undated) DEVICE — APPLICATOR CHLORAPREP ORN 26ML

## (undated) DEVICE — HEMOSTAT SURGICEL 4X8IN

## (undated) DEVICE — ELECTRODE REM PLYHSV RETURN 9

## (undated) DEVICE — POSITIONER IV ARMBOARD FOAM

## (undated) DEVICE — CONTAINER SPECIMEN STRL 4OZ

## (undated) DEVICE — SPONGE IV DRAIN 4X4 STERILE

## (undated) DEVICE — DRAIN PENROSE XRAY 18 X 1/4 ST

## (undated) DEVICE — Device

## (undated) DEVICE — SUT MCRYL PLUS 4-0 PS2 27IN

## (undated) DEVICE — GAUZE SPONGE 4X4 12PLY

## (undated) DEVICE — CLOSURE SKIN STERI STRIP 1/2X4

## (undated) DEVICE — APPLIER CLIP LIAGCLIP 9.375IN

## (undated) DEVICE — SUT 2-0 12-18IN SILK

## (undated) DEVICE — SUT VICRYL CTD 4-0 STD 18

## (undated) DEVICE — SUT VICRYL PLUS 3-0 18IN

## (undated) DEVICE — CORD FOR BIPOLAR FORCEPS 12

## (undated) DEVICE — POWDER ARISTA AH 3G

## (undated) DEVICE — SUT 4-0 12-18IN SILK BLACK

## (undated) DEVICE — DRAPE THYROID WITH ARMBOARD

## (undated) DEVICE — SUT VICRYL PLUS 3-0 SH 18IN

## (undated) DEVICE — SUT 3-0 12-18IN SILK

## (undated) DEVICE — BLADE SURG STAINLESS STEEL #15

## (undated) DEVICE — DRESSING TRANS 4X4 TEGADERM

## (undated) DEVICE — DRESSING GZ XRAY 12PLY 4X8 STR

## (undated) DEVICE — TAPE MEDIPORE 4IN X 2YDS

## (undated) DEVICE — SPONGE KITTNER 1/4X 5/8 L STRL

## (undated) DEVICE — ELECTRODE BLD EXT INSUL 1

## (undated) DEVICE — SYS CLSR DERMABOND PRINEO 22CM